# Patient Record
Sex: FEMALE | Race: WHITE
[De-identification: names, ages, dates, MRNs, and addresses within clinical notes are randomized per-mention and may not be internally consistent; named-entity substitution may affect disease eponyms.]

---

## 2017-01-13 ENCOUNTER — PRIOR ORIGINAL RECORDS (OUTPATIENT)
Dept: OTHER | Age: 67
End: 2017-01-13

## 2017-08-30 ENCOUNTER — PRIOR ORIGINAL RECORDS (OUTPATIENT)
Dept: OTHER | Age: 67
End: 2017-08-30

## 2017-09-01 ENCOUNTER — PRIOR ORIGINAL RECORDS (OUTPATIENT)
Dept: OTHER | Age: 67
End: 2017-09-01

## 2017-09-05 ENCOUNTER — MYAURORA ACCOUNT LINK (OUTPATIENT)
Dept: OTHER | Age: 67
End: 2017-09-05

## 2017-11-06 ENCOUNTER — PRIOR ORIGINAL RECORDS (OUTPATIENT)
Dept: OTHER | Age: 67
End: 2017-11-06

## 2017-11-15 ENCOUNTER — PRIOR ORIGINAL RECORDS (OUTPATIENT)
Dept: OTHER | Age: 67
End: 2017-11-15

## 2017-11-27 ENCOUNTER — HOSPITAL ENCOUNTER (OUTPATIENT)
Dept: CARDIOLOGY CLINIC | Facility: HOSPITAL | Age: 67
Discharge: HOME | End: 2017-11-27
Attending: INTERNAL MEDICINE

## 2017-11-27 DIAGNOSIS — I65.23 BILATERAL CAROTID ARTERY STENOSIS: ICD-10-CM

## 2017-12-04 ENCOUNTER — PRIOR ORIGINAL RECORDS (OUTPATIENT)
Dept: OTHER | Age: 67
End: 2017-12-04

## 2017-12-11 ENCOUNTER — PRIOR ORIGINAL RECORDS (OUTPATIENT)
Dept: OTHER | Age: 67
End: 2017-12-11

## 2017-12-13 ENCOUNTER — HOSPITAL ENCOUNTER (OUTPATIENT)
Dept: ULTRASOUND IMAGING | Facility: HOSPITAL | Age: 67
Discharge: HOME | End: 2017-12-13
Attending: INTERNAL MEDICINE
Payer: COMMERCIAL

## 2017-12-13 DIAGNOSIS — E04.1 THYROID NODULE: ICD-10-CM

## 2017-12-13 DIAGNOSIS — E04.1 THYROID CYST: ICD-10-CM

## 2017-12-13 PROCEDURE — 76536 US EXAM OF HEAD AND NECK: CPT | Performed by: INTERNAL MEDICINE

## 2018-06-14 ENCOUNTER — HOSPITAL ENCOUNTER (OUTPATIENT)
Dept: ULTRASOUND IMAGING | Facility: HOSPITAL | Age: 68
Discharge: HOME | End: 2018-06-14
Attending: INTERNAL MEDICINE
Payer: COMMERCIAL

## 2018-06-14 DIAGNOSIS — E04.1 THYROID CYST: ICD-10-CM

## 2018-06-14 DIAGNOSIS — E04.1 THYROID NODULE: ICD-10-CM

## 2018-06-14 PROCEDURE — 76536 US EXAM OF HEAD AND NECK: CPT | Performed by: INTERNAL MEDICINE

## 2018-07-13 ENCOUNTER — PRIOR ORIGINAL RECORDS (OUTPATIENT)
Dept: OTHER | Age: 68
End: 2018-07-13

## 2018-07-31 ENCOUNTER — PRIOR ORIGINAL RECORDS (OUTPATIENT)
Dept: OTHER | Age: 68
End: 2018-07-31

## 2018-09-07 ENCOUNTER — PRIOR ORIGINAL RECORDS (OUTPATIENT)
Dept: OTHER | Age: 68
End: 2018-09-07

## 2018-10-17 ENCOUNTER — HOSPITAL ENCOUNTER (OUTPATIENT)
Dept: CV DIAGNOSTICS | Facility: HOSPITAL | Age: 68
Discharge: HOME | End: 2018-10-17
Attending: INTERNAL MEDICINE
Payer: COMMERCIAL

## 2018-10-17 ENCOUNTER — PRIOR ORIGINAL RECORDS (OUTPATIENT)
Dept: OTHER | Age: 68
End: 2018-10-17

## 2018-10-17 DIAGNOSIS — I10 HTN (HYPERTENSION): ICD-10-CM

## 2018-10-17 DIAGNOSIS — I65.29 CAROTID ARTERY STENOSIS: ICD-10-CM

## 2018-10-17 DIAGNOSIS — R94.31 ABNORMAL EKG: ICD-10-CM

## 2018-10-17 DIAGNOSIS — E78.00 HYPERCHOLESTEREMIA: ICD-10-CM

## 2018-10-17 PROCEDURE — 93350 STRESS TTE ONLY: CPT | Performed by: INTERNAL MEDICINE

## 2018-10-17 PROCEDURE — 93017 CV STRESS TEST TRACING ONLY: CPT | Performed by: INTERNAL MEDICINE

## 2018-10-17 PROCEDURE — 93018 CV STRESS TEST I&R ONLY: CPT | Performed by: INTERNAL MEDICINE

## 2018-10-19 ENCOUNTER — PRIOR ORIGINAL RECORDS (OUTPATIENT)
Dept: OTHER | Age: 68
End: 2018-10-19

## 2018-11-05 ENCOUNTER — MYAURORA ACCOUNT LINK (OUTPATIENT)
Dept: OTHER | Age: 68
End: 2018-11-05

## 2018-11-05 ENCOUNTER — PRIOR ORIGINAL RECORDS (OUTPATIENT)
Dept: OTHER | Age: 68
End: 2018-11-05

## 2018-11-13 LAB
ALBUMIN: 4.6 G/DL
ALKALINE PHOSPHATATE(ALK PHOS): 54 IU/L
BILIRUBIN TOTAL: 0.6 MG/DL
BUN: 14 MG/DL
CALCIUM: 9.8 MG/DL
CHLORIDE: 102 MEQ/L
CHOLESTEROL, TOTAL: 167 MG/DL
GLOBULIN: 2.5 G/DL
GLUCOSE: 105 MG/DL
HDL CHOLESTEROL: 42 MG/DL
HEMATOCRIT: 43.1 %
HEMOGLOBIN A1C: 6.9 %
HEMOGLOBIN: 15.1 G/DL
LDL CHOLESTEROL: 103 MG/DL
PLATELETS: 310 K/UL
POTASSIUM, SERUM: 4.5 MEQ/L
PROTEIN, TOTAL: 7.1 G/DL
RED BLOOD COUNT: 4.65 X 10-6/U
SGOT (AST): 32 IU/L
SGPT (ALT): 27 IU/L
SODIUM: 143 MEQ/L
TRIGLYCERIDES: 112 MG/DL
VITAMIN D 25-OH: 27.6 NG/ML
WHITE BLOOD COUNT: 5.3 X 10-3/U

## 2019-02-28 VITALS — WEIGHT: 230 LBS | DIASTOLIC BLOOD PRESSURE: 80 MMHG | SYSTOLIC BLOOD PRESSURE: 130 MMHG | HEART RATE: 68 BPM

## 2019-02-28 VITALS — HEART RATE: 70 BPM | DIASTOLIC BLOOD PRESSURE: 70 MMHG | SYSTOLIC BLOOD PRESSURE: 106 MMHG | HEIGHT: 67 IN

## 2019-04-17 RX ORDER — NEBIVOLOL HYDROCHLORIDE 5 MG/1
TABLET ORAL
COMMUNITY
End: 2019-11-04 | Stop reason: SDUPTHER

## 2019-06-06 ENCOUNTER — LAB ENCOUNTER (OUTPATIENT)
Dept: LAB | Facility: HOSPITAL | Age: 69
End: 2019-06-06
Attending: INTERNAL MEDICINE
Payer: COMMERCIAL

## 2019-06-06 DIAGNOSIS — R19.7 DIARRHEA: Primary | ICD-10-CM

## 2019-06-06 PROCEDURE — 87493 C DIFF AMPLIFIED PROBE: CPT

## 2019-06-06 PROCEDURE — 87046 STOOL CULTR AEROBIC BACT EA: CPT

## 2019-06-06 PROCEDURE — 87272 CRYPTOSPORIDIUM AG IF: CPT

## 2019-06-06 PROCEDURE — 87045 FECES CULTURE AEROBIC BACT: CPT

## 2019-06-06 PROCEDURE — 87209 SMEAR COMPLEX STAIN: CPT

## 2019-06-06 PROCEDURE — 87077 CULTURE AEROBIC IDENTIFY: CPT

## 2019-06-06 PROCEDURE — 87329 GIARDIA AG IA: CPT

## 2019-06-06 PROCEDURE — 87177 OVA AND PARASITES SMEARS: CPT

## 2019-06-13 ENCOUNTER — LAB ENCOUNTER (OUTPATIENT)
Dept: LAB | Facility: HOSPITAL | Age: 69
End: 2019-06-13
Attending: INTERNAL MEDICINE
Payer: COMMERCIAL

## 2019-06-13 DIAGNOSIS — Z01.818 PREOP TESTING: Primary | ICD-10-CM

## 2019-06-13 PROCEDURE — 36415 COLL VENOUS BLD VENIPUNCTURE: CPT

## 2019-06-13 PROCEDURE — 82565 ASSAY OF CREATININE: CPT

## 2019-06-19 ENCOUNTER — HOSPITAL ENCOUNTER (OUTPATIENT)
Dept: CT IMAGING | Facility: HOSPITAL | Age: 69
Discharge: HOME | End: 2019-06-19
Attending: INTERNAL MEDICINE
Payer: COMMERCIAL

## 2019-06-19 ENCOUNTER — ANESTHESIA EVENT (OUTPATIENT)
Dept: SURGERY | Facility: HOSPITAL | Age: 69
End: 2019-06-19

## 2019-06-19 ENCOUNTER — HOSPITAL ENCOUNTER (INPATIENT)
Facility: HOSPITAL | Age: 69
LOS: 10 days | Discharge: HOME HEALTH | DRG: 329 | End: 2019-06-29
Attending: EMERGENCY MEDICINE | Admitting: INTERNAL MEDICINE
Payer: COMMERCIAL

## 2019-06-19 ENCOUNTER — ANESTHESIA (OUTPATIENT)
Dept: SURGERY | Facility: HOSPITAL | Age: 69
End: 2019-06-19

## 2019-06-19 DIAGNOSIS — K57.40 DIVERTICULITIS OF BOTH SMALL AND LARGE INTESTINE WITH PERFORATION AND ABSCESS: Primary | ICD-10-CM

## 2019-06-19 DIAGNOSIS — K66.8 FREE INTRAPERITONEAL AIR: ICD-10-CM

## 2019-06-19 DIAGNOSIS — K57.92 DIVERTICULITIS: ICD-10-CM

## 2019-06-19 DIAGNOSIS — R10.9 ABDOMINAL PAIN: ICD-10-CM

## 2019-06-19 DIAGNOSIS — R19.5 ABNORMAL STOOLS: ICD-10-CM

## 2019-06-19 LAB
ALBUMIN SERPL-MCNC: 3.1 G/DL (ref 3.4–5)
ALBUMIN/GLOB SERPL: 0.7 {RATIO} (ref 1–2)
ALP LIVER SERPL-CCNC: 79 U/L (ref 55–142)
ALT SERPL-CCNC: 22 U/L (ref 13–56)
ANION GAP SERPL CALC-SCNC: 9 MMOL/L (ref 0–18)
AST SERPL-CCNC: 23 U/L (ref 15–37)
BASOPHILS # BLD AUTO: 0.05 X10(3) UL (ref 0–0.2)
BASOPHILS NFR BLD AUTO: 0.4 %
BILIRUB SERPL-MCNC: 0.7 MG/DL (ref 0.1–2)
BUN BLD-MCNC: 8 MG/DL (ref 7–18)
BUN/CREAT SERPL: 11.1 (ref 10–20)
CALCIUM BLD-MCNC: 8.9 MG/DL (ref 8.5–10.1)
CHLORIDE SERPL-SCNC: 102 MMOL/L (ref 98–112)
CO2 SERPL-SCNC: 27 MMOL/L (ref 21–32)
CREAT BLD-MCNC: 0.72 MG/DL (ref 0.55–1.02)
DEPRECATED RDW RBC AUTO: 46.5 FL (ref 35.1–46.3)
EOSINOPHIL # BLD AUTO: 0.04 X10(3) UL (ref 0–0.7)
EOSINOPHIL NFR BLD AUTO: 0.3 %
ERYTHROCYTE [DISTWIDTH] IN BLOOD BY AUTOMATED COUNT: 13.2 % (ref 11–15)
GLOBULIN PLAS-MCNC: 4.2 G/DL (ref 2.8–4.4)
GLUCOSE BLD-MCNC: 99 MG/DL (ref 70–99)
HCT VFR BLD AUTO: 37.6 % (ref 35–48)
HGB BLD-MCNC: 12.2 G/DL (ref 12–16)
IMM GRANULOCYTES # BLD AUTO: 0.03 X10(3) UL (ref 0–1)
IMM GRANULOCYTES NFR BLD: 0.3 %
LACTATE SERPL-SCNC: 1 MMOL/L (ref 0.4–2)
LYMPHOCYTES # BLD AUTO: 1.95 X10(3) UL (ref 1–4)
LYMPHOCYTES NFR BLD AUTO: 16.4 %
M PROTEIN MFR SERPL ELPH: 7.3 G/DL (ref 6.4–8.2)
MCH RBC QN AUTO: 31 PG (ref 26–34)
MCHC RBC AUTO-ENTMCNC: 32.4 G/DL (ref 31–37)
MCV RBC AUTO: 95.7 FL (ref 80–100)
MONOCYTES # BLD AUTO: 0.79 X10(3) UL (ref 0.1–1)
MONOCYTES NFR BLD AUTO: 6.6 %
NEUTROPHILS # BLD AUTO: 9.06 X10 (3) UL (ref 1.5–7.7)
NEUTROPHILS # BLD AUTO: 9.06 X10(3) UL (ref 1.5–7.7)
NEUTROPHILS NFR BLD AUTO: 76 %
OSMOLALITY SERPL CALC.SUM OF ELEC: 284 MOSM/KG (ref 275–295)
PLATELET # BLD AUTO: 386 10(3)UL (ref 150–450)
POTASSIUM SERPL-SCNC: 3.6 MMOL/L (ref 3.5–5.1)
RBC # BLD AUTO: 3.93 X10(6)UL (ref 3.8–5.3)
SODIUM SERPL-SCNC: 138 MMOL/L (ref 136–145)
WBC # BLD AUTO: 11.9 X10(3) UL (ref 4–11)

## 2019-06-19 PROCEDURE — 99223 1ST HOSP IP/OBS HIGH 75: CPT | Performed by: COLON & RECTAL SURGERY

## 2019-06-19 PROCEDURE — 0DTN0ZZ RESECTION OF SIGMOID COLON, OPEN APPROACH: ICD-10-PCS | Performed by: COLON & RECTAL SURGERY

## 2019-06-19 PROCEDURE — 74177 CT ABD & PELVIS W/CONTRAST: CPT | Performed by: INTERNAL MEDICINE

## 2019-06-19 PROCEDURE — 0UT50ZZ RESECTION OF RIGHT FALLOPIAN TUBE, OPEN APPROACH: ICD-10-PCS | Performed by: COLON & RECTAL SURGERY

## 2019-06-19 PROCEDURE — 0W9F0ZZ DRAINAGE OF ABDOMINAL WALL, OPEN APPROACH: ICD-10-PCS | Performed by: COLON & RECTAL SURGERY

## 2019-06-19 PROCEDURE — 0DBU0ZX EXCISION OF OMENTUM, OPEN APPROACH, DIAGNOSTIC: ICD-10-PCS | Performed by: COLON & RECTAL SURGERY

## 2019-06-19 PROCEDURE — 3E0M05Z INTRODUCTION OF ADHESION BARRIER INTO PERITONEAL CAVITY, OPEN APPROACH: ICD-10-PCS | Performed by: COLON & RECTAL SURGERY

## 2019-06-19 PROCEDURE — 0UT00ZZ RESECTION OF RIGHT OVARY, OPEN APPROACH: ICD-10-PCS | Performed by: COLON & RECTAL SURGERY

## 2019-06-19 PROCEDURE — 44143 PARTIAL REMOVAL OF COLON: CPT | Performed by: COLON & RECTAL SURGERY

## 2019-06-19 PROCEDURE — 07BB0ZX EXCISION OF MESENTERIC LYMPHATIC, OPEN APPROACH, DIAGNOSTIC: ICD-10-PCS | Performed by: COLON & RECTAL SURGERY

## 2019-06-19 PROCEDURE — 58940 REMOVAL OF OVARY(S): CPT | Performed by: COLON & RECTAL SURGERY

## 2019-06-19 PROCEDURE — 0D1N0Z4 BYPASS SIGMOID COLON TO CUTANEOUS, OPEN APPROACH: ICD-10-PCS | Performed by: COLON & RECTAL SURGERY

## 2019-06-19 PROCEDURE — 0DBP0ZZ EXCISION OF RECTUM, OPEN APPROACH: ICD-10-PCS | Performed by: COLON & RECTAL SURGERY

## 2019-06-19 DEVICE — SEPRAFILM ADHESION BARRIER (MEMBRANE) IS A STERILE, BIORESORBABLE, TRANSLUCENT ADHESION BARRIER COMPOSED OF TWO ANIONIC POLYSACCHARIDES, SODIUM HYALURONATE (HA) AND CARBOXYMETHYLCELLULOSE (CMC).
Type: IMPLANTABLE DEVICE | Site: ABDOMEN | Status: FUNCTIONAL
Brand: SEPRAFILM

## 2019-06-19 RX ORDER — DIPHENHYDRAMINE HYDROCHLORIDE 50 MG/ML
12.5 INJECTION INTRAMUSCULAR; INTRAVENOUS EVERY 4 HOURS PRN
Status: DISCONTINUED | OUTPATIENT
Start: 2019-06-19 | End: 2019-06-24

## 2019-06-19 RX ORDER — MEPERIDINE HYDROCHLORIDE 25 MG/ML
12.5 INJECTION INTRAMUSCULAR; INTRAVENOUS; SUBCUTANEOUS AS NEEDED
Status: DISCONTINUED | OUTPATIENT
Start: 2019-06-19 | End: 2019-06-19 | Stop reason: HOSPADM

## 2019-06-19 RX ORDER — MAGNESIUM HYDROXIDE 1200 MG/15ML
LIQUID ORAL CONTINUOUS PRN
Status: COMPLETED | OUTPATIENT
Start: 2019-06-19 | End: 2019-06-19

## 2019-06-19 RX ORDER — DIPHENHYDRAMINE HYDROCHLORIDE 50 MG/ML
12.5 INJECTION INTRAMUSCULAR; INTRAVENOUS AS NEEDED
Status: DISCONTINUED | OUTPATIENT
Start: 2019-06-19 | End: 2019-06-19 | Stop reason: HOSPADM

## 2019-06-19 RX ORDER — HEPARIN SODIUM 5000 [USP'U]/ML
5000 INJECTION, SOLUTION INTRAVENOUS; SUBCUTANEOUS EVERY 8 HOURS SCHEDULED
Status: DISCONTINUED | OUTPATIENT
Start: 2019-06-20 | End: 2019-06-29

## 2019-06-19 RX ORDER — SODIUM CHLORIDE, SODIUM LACTATE, POTASSIUM CHLORIDE, CALCIUM CHLORIDE 600; 310; 30; 20 MG/100ML; MG/100ML; MG/100ML; MG/100ML
INJECTION, SOLUTION INTRAVENOUS CONTINUOUS
Status: DISCONTINUED | OUTPATIENT
Start: 2019-06-19 | End: 2019-06-19 | Stop reason: HOSPADM

## 2019-06-19 RX ORDER — FAMOTIDINE 10 MG/ML
20 INJECTION, SOLUTION INTRAVENOUS 2 TIMES DAILY
Status: DISCONTINUED | OUTPATIENT
Start: 2019-06-19 | End: 2019-06-29

## 2019-06-19 RX ORDER — HYDROMORPHONE HYDROCHLORIDE 1 MG/ML
0.4 INJECTION, SOLUTION INTRAMUSCULAR; INTRAVENOUS; SUBCUTANEOUS EVERY 2 HOUR PRN
Status: CANCELLED | OUTPATIENT
Start: 2019-06-19

## 2019-06-19 RX ORDER — HYDROMORPHONE HYDROCHLORIDE 1 MG/ML
INJECTION, SOLUTION INTRAMUSCULAR; INTRAVENOUS; SUBCUTANEOUS
Status: COMPLETED
Start: 2019-06-19 | End: 2019-06-19

## 2019-06-19 RX ORDER — METOCLOPRAMIDE HYDROCHLORIDE 5 MG/ML
10 INJECTION INTRAMUSCULAR; INTRAVENOUS AS NEEDED
Status: DISCONTINUED | OUTPATIENT
Start: 2019-06-19 | End: 2019-06-19 | Stop reason: HOSPADM

## 2019-06-19 RX ORDER — HYDROCODONE BITARTRATE AND ACETAMINOPHEN 5; 325 MG/1; MG/1
1-2 TABLET ORAL EVERY 4 HOURS PRN
Status: DISCONTINUED | OUTPATIENT
Start: 2019-06-19 | End: 2019-06-21

## 2019-06-19 RX ORDER — HYDROMORPHONE HYDROCHLORIDE 1 MG/ML
0.8 INJECTION, SOLUTION INTRAMUSCULAR; INTRAVENOUS; SUBCUTANEOUS EVERY 2 HOUR PRN
Status: CANCELLED | OUTPATIENT
Start: 2019-06-19

## 2019-06-19 RX ORDER — SODIUM CHLORIDE 9 MG/ML
INJECTION, SOLUTION INTRAVENOUS CONTINUOUS
Status: ACTIVE | OUTPATIENT
Start: 2019-06-19 | End: 2019-06-20

## 2019-06-19 RX ORDER — MIDAZOLAM HYDROCHLORIDE 1 MG/ML
1 INJECTION INTRAMUSCULAR; INTRAVENOUS EVERY 5 MIN PRN
Status: DISCONTINUED | OUTPATIENT
Start: 2019-06-19 | End: 2019-06-19 | Stop reason: HOSPADM

## 2019-06-19 RX ORDER — ACETAMINOPHEN 500 MG
1000 TABLET ORAL EVERY 8 HOURS
Status: DISCONTINUED | OUTPATIENT
Start: 2019-06-19 | End: 2019-06-21

## 2019-06-19 RX ORDER — ONDANSETRON 2 MG/ML
4 INJECTION INTRAMUSCULAR; INTRAVENOUS EVERY 6 HOURS PRN
Status: DISCONTINUED | OUTPATIENT
Start: 2019-06-19 | End: 2019-06-24

## 2019-06-19 RX ORDER — NALOXONE HYDROCHLORIDE 0.4 MG/ML
80 INJECTION, SOLUTION INTRAMUSCULAR; INTRAVENOUS; SUBCUTANEOUS AS NEEDED
Status: DISCONTINUED | OUTPATIENT
Start: 2019-06-19 | End: 2019-06-19 | Stop reason: HOSPADM

## 2019-06-19 RX ORDER — HYDROCODONE BITARTRATE AND ACETAMINOPHEN 5; 325 MG/1; MG/1
2 TABLET ORAL AS NEEDED
Status: DISCONTINUED | OUTPATIENT
Start: 2019-06-19 | End: 2019-06-19 | Stop reason: HOSPADM

## 2019-06-19 RX ORDER — ONDANSETRON 2 MG/ML
4 INJECTION INTRAMUSCULAR; INTRAVENOUS EVERY 6 HOURS PRN
Status: DISCONTINUED | OUTPATIENT
Start: 2019-06-19 | End: 2019-06-29

## 2019-06-19 RX ORDER — HYDROMORPHONE HYDROCHLORIDE 1 MG/ML
0.2 INJECTION, SOLUTION INTRAMUSCULAR; INTRAVENOUS; SUBCUTANEOUS EVERY 2 HOUR PRN
Status: CANCELLED | OUTPATIENT
Start: 2019-06-19

## 2019-06-19 RX ORDER — ACETAMINOPHEN 10 MG/ML
INJECTION, SOLUTION INTRAVENOUS
Status: COMPLETED
Start: 2019-06-19 | End: 2019-06-19

## 2019-06-19 RX ORDER — ACETAMINOPHEN 10 MG/ML
1000 INJECTION, SOLUTION INTRAVENOUS ONCE
Status: COMPLETED | OUTPATIENT
Start: 2019-06-19 | End: 2019-06-19

## 2019-06-19 RX ORDER — DEXTROSE, SODIUM CHLORIDE, AND POTASSIUM CHLORIDE 5; .45; .15 G/100ML; G/100ML; G/100ML
INJECTION INTRAVENOUS CONTINUOUS
Status: DISCONTINUED | OUTPATIENT
Start: 2019-06-19 | End: 2019-06-26

## 2019-06-19 RX ORDER — NALBUPHINE HCL 10 MG/ML
2.5 AMPUL (ML) INJECTION EVERY 4 HOURS PRN
Status: DISCONTINUED | OUTPATIENT
Start: 2019-06-19 | End: 2019-06-24

## 2019-06-19 RX ORDER — HYDROCODONE BITARTRATE AND ACETAMINOPHEN 5; 325 MG/1; MG/1
1 TABLET ORAL AS NEEDED
Status: DISCONTINUED | OUTPATIENT
Start: 2019-06-19 | End: 2019-06-19 | Stop reason: HOSPADM

## 2019-06-19 RX ORDER — KETOROLAC TROMETHAMINE 15 MG/ML
15 INJECTION, SOLUTION INTRAMUSCULAR; INTRAVENOUS EVERY 6 HOURS
Status: COMPLETED | OUTPATIENT
Start: 2019-06-19 | End: 2019-06-20

## 2019-06-19 RX ORDER — ONDANSETRON 2 MG/ML
4 INJECTION INTRAMUSCULAR; INTRAVENOUS AS NEEDED
Status: DISCONTINUED | OUTPATIENT
Start: 2019-06-19 | End: 2019-06-19 | Stop reason: HOSPADM

## 2019-06-19 RX ORDER — NALOXONE HYDROCHLORIDE 0.4 MG/ML
0.08 INJECTION, SOLUTION INTRAMUSCULAR; INTRAVENOUS; SUBCUTANEOUS
Status: DISCONTINUED | OUTPATIENT
Start: 2019-06-19 | End: 2019-06-24

## 2019-06-19 RX ORDER — HYDROMORPHONE HYDROCHLORIDE 1 MG/ML
0.4 INJECTION, SOLUTION INTRAMUSCULAR; INTRAVENOUS; SUBCUTANEOUS EVERY 5 MIN PRN
Status: DISCONTINUED | OUTPATIENT
Start: 2019-06-19 | End: 2019-06-19 | Stop reason: HOSPADM

## 2019-06-19 RX ORDER — NEBIVOLOL 10 MG/1
10 TABLET ORAL
Status: DISCONTINUED | OUTPATIENT
Start: 2019-06-20 | End: 2019-06-29

## 2019-06-19 NOTE — ED PROVIDER NOTES
Patient Seen in: [facility] Emergency Department    History   Patient presents with:  Abnormal Result (metabolic, cardiac)    Stated Complaint: Returned for abnml CT    HPI    CHIEF COMPLAINT: Abdominal pain, diarrhea    HISTORY OF PRESENT ILLNESS: reduction   • MATRL FOR VOCAL CORD     • OTHER  Dec 2013    Lt wrist    • REMOVAL OF TONSILS,12+ Y/O     • TOTAL ABDOM HYSTERECTOMY             Social History    Tobacco Use      Smoking status: Never Smoker    Alcohol use: Not on file    Drug use: Not components:    WBC 11.9 (*)     RDW-SD 46.5 (*)     Neutrophil Absolute Prelim 9.06 (*)     Neutrophil Absolute 9.06 (*)     All other components within normal limits   LACTIC ACID, PLASMA - Normal   CBC WITH DIFFERENTIAL WITH PLATELET    Narrative:      Jose Aceves consult. Patient's CBC revealed a white count 11.9, normal hemoglobin hematocrit. Lactate 1,       I discussed the radiology and laboratory results with the patient. I discussed the diagnosis and admission for further evaluation and care.  Patient state

## 2019-06-19 NOTE — H&P
[facility]  History & Physical    Shahid Lowry Patient Status:  Inpatient    1950 MRN [id number]   [facility] PRE OP HOLDING Attending Re Costa MD   Hosp Day # 0 PCP Syl Wilkes MD     History of Present Illness:  Be before breakfast. Disp:  Rfl:  Not Taking       Review of Systems: No fever chills; some mucoid stools but no gross blood. Physical Exam:    Alert white female, uncomfortable but not in extremis.     /78   Pulse 95   Temp 99.9 °F (37.7 °C) (Tempora reduction techniques were used. Dose information is   transmitted to the [facility] [employer]) NRDR ([address]) which includes the Dose Index Registry.      PATIENT STATED HISTORY:(As transcribed by Technologist)  The pa These are somewhat uncertain   in etiology. Differential considerations are broad and would include abscesses, adnexal cysts, lymphoceles or seromas with or without super infection. Mild to moderate left hydroureter is present.   This may be due to mas

## 2019-06-19 NOTE — ANESTHESIA PREPROCEDURE EVALUATION
PRE-OP EVALUATION    Patient Name: Desiree York    Pre-op Diagnosis: Free intraperitoneal air [K66.8]    Procedure(s):  EXPLORATORY LAPAROTOMY COLON RESECTION  , COLOSTOMY    Surgeon(s) and Role:     Mark Sommer MD - Primary    Pre-op vitals reviewed BUN 8 06/19/2019    CREATSERUM 0.72 06/19/2019    GLU 99 06/19/2019    CA 8.9 06/19/2019            Airway      Mallampati: II  Mouth opening: 3 FB  TM distance: 4 - 6 cm  Neck ROM: full Cardiovascular      Rhythm: regular  Rate: normal     Dental  Comment

## 2019-06-19 NOTE — ED INITIAL ASSESSMENT (HPI)
Patient reports she had a CT of her belly today, was told it was a perforated diverticula with sacs of infection    + abdominal pain, denies fevers or dizziness

## 2019-06-20 NOTE — PROGRESS NOTES
[facility]    Progress Note    Dipesh Lowry Patient Status:  Inpatient    1950 MRN [id number]   [facility] 3NW-A Attending Rafita Cheney MD   Hosp Day # 1 PCP Markell Platt MD       SUBJECTIVE:  No CP, SOB, or N/V. Pt is s Nebivolol HCl (BYSTOLIC) tab 10 mg 10 mg Oral Daily Beta Blocker   Heparin Sodium (Porcine) 5000 UNIT/ML injection 5,000 Units 5,000 Units Subcutaneous Q8H [facility] HOME   Ketorolac Tromethamine (TORADOL) injection 15 mg 15 mg Intravenous Q6H   acetaminophen (TYLE

## 2019-06-20 NOTE — PHYSICAL THERAPY NOTE
PHYSICAL THERAPY EVALUATION - INPATIENT     Room Number: 315/315-A  Evaluation Date: 6/20/2019  Type of Evaluation: Initial  Physician Order: PT Eval and Treat    Presenting Problem: s/p LAR, drainage of pelvic abscess, oophrectomy and new ostomy cre commands with increased time    RANGE OF MOTION AND STRENGTH ASSESSMENT  Upper extremity ROM and strength are within functional limits     Lower extremity ROM is within functional limits     Lower extremity strength is within functional limits     BALANCE mechanics  Functional activity tolerated  Gait training  Transfer training    Patient End of Session: Up in chair;Needs met;Call light within reach;RN aware of session/findings; All patient questions and concerns addressed; Discussed recommendations with keke

## 2019-06-20 NOTE — CONSULTS
Called to evaluate patient for possible post-operative corneal abrasion. Pt evaluated in AM and admits R sided eye burning, but does not admit feeling of foreign body.     Plan on trial of tobramycin ointment in R eye and if not working recommend optho con

## 2019-06-20 NOTE — PROGRESS NOTES
Vss. Pt a&ox3. Pt on ra with 02 sats wnl. Lungs cta. Pt denies difficulty breathing or sob. Pt denies n/v. Tolerating ice chips well. Denies passing flatus. Colostomy appliance intact and small amount of serosanguinous drainage present in appliance bag.  St

## 2019-06-20 NOTE — PROGRESS NOTES
[facility] Pharmacy Note:  Pain Consult    Betsy Guzman is a 76year old female started on Dilaudid PCA by DAVID Valencia. Pharmacy was consulted to review medication profile and to discontinue previously ordered narcotics and sedatives.     Medication pr

## 2019-06-20 NOTE — HOME CARE LIAISON
MET WITH PTNT AND OFFERED CHOICE  OF AGENCIES. PTNT AGREEABLE TO [facility]. MET WITH PTNT TO DISCUSS HOME HEALTH SERVICES AND COVERAGE CRITERIA. PTNT AGREEABLE TO S[facility]. PTNT GIVEN RESIDENTIAL BROCHURE.  RESIDENTIAL WITH PROVIDE SN ON DISCHAR

## 2019-06-20 NOTE — OCCUPATIONAL THERAPY NOTE
OCCUPATIONAL THERAPY QUICK EVALUATION - INPATIENT    Room Number: 315/315-A  Evaluation Date: 6/20/2019     Type of Evaluation: Initial  Presenting Problem: colon resection    Physician Order: IP Consult to Occupational Therapy  Reason for Therapy:  ADL/IA Activity promotion; Body mechanics;Breathing techniques;Relaxation;Repositioning    COGNITION  WNL    RANGE OF MOTION AND STRENGTH ASSESSMENT  Upper extremity ROM is within functional limits     Upper extremity strength is within functional limits     NEURO all ADL tasks, functional transfers, dynamic reaching and functional mobility safely, without loss of balance, and at supervision level or above. Patient reports no further questions or concerns about safe return to I/ADL tasks.  No further OT services need

## 2019-06-20 NOTE — PROGRESS NOTES
Pt. C/o bilateral hand swelling and bilateral feet swelling. Pt. Has been laying and resting in bed in dependent position. Instructed pt. To do range of motion, move extremities. Dr. Lj Smith paged to ask about IVF orders.

## 2019-06-20 NOTE — PROGRESS NOTES
Patient states pain to right eye is much better since receiving dose of tobramycin dose this am. Pt states it did leave a bit of film in eye and made it harder to see for a short time after application.  Pt refusing dose at this time due to that but states

## 2019-06-20 NOTE — CM/SW NOTE
75 yo sp LAR, drainage of pelvic abscess, oophrectomy and new ostomy creation. Discussed pt during daily rounds and per RN pt should have f/u teaching for ostomy care. Referred to [facility] with [facility]. She will meet with pt.  /case manage

## 2019-06-20 NOTE — PAYOR COMM NOTE
--------------  ADMISSION REVIEW     Payor: Marco A [facility] #:  [id number]  Authorization Number: N/A    ED Provider Notes      Patient Seen in: [facility] Emergency Department    History   Patient presents with:  Noemy West 120/80   Pulse 105   Resp 18   Temp 99.9 °F (37.7 °C)   Temp src Temporal   SpO2 95 %   O2 Device None (Room air)       Current:/78   Pulse 95   Temp 99.9 °F (37.7 °C) (Temporal)   Resp 16   Ht 167.6 cm (5' 6\")   Wt 81.6 kg   SpO2 96%   BMI 29.05 kg Abnormal            Final result                 Please view results for these tests on the individual orders.    RAINBOW DRAW BLUE   RAINBOW DRAW LAVENDER   RAINBOW DRAW LIGHT GREEN   RAINBOW DRAW GOLD           Ct Abdomen+pelvis(contrast Only)(cpt=74177) The patient was called and referred urgently to the [facility] emergency room. She was     Physical Exam:    Alert white female, uncomfortable but not in extremis.     /78   Pulse 95   Temp 99.9 °F (37.7 °C) (Temporal)   Resp 16   Ht 167.6 cm (5' 6\") is no prior CT for comparison    Hypertension––Blood pressure is mildly elevated no doubt due to pain and acute events    Dyslipidemia–continue medication when patient resumes p.o.     General–additional orders based on patient findings at surgery and subse L/min         06/20/19 0559 98.4 °F (36.9 °C) — — 109/61 — — —       06/19/19 1434 99.9 °F (37.7 °C) 105 18 120/80 95 % 180 lb None (Room air)             Appropriate for inpatient status per guidelines for perforated diverticulitis with free intraperitone

## 2019-06-20 NOTE — PROGRESS NOTES
[facility]  Progress Note    Shola Lowry Patient Status:  Inpatient    1950 MRN [id number]   [facility] 3NW-A Attending Misti Amin MD   Hosp Day # 1 PCP Diana Carr MD     Subjective:  No new complaints, incisional pa Acute diverticulitis    Perforated diverticulitis path pending    Plan:  1. IV's, Ambulate, DVT prophylaxis  2. Clear liquids, advance as tolerated  3. Stoma care consult  4. May shower    My total face time with this patient was 30 minutes.   [facility] curtis

## 2019-06-20 NOTE — ANESTHESIA POSTPROCEDURE EVALUATION
[address] [place] [address] Patient Status:  Inpatient   Age/Gender 76year old female MRN [id number]   [facility] SURGERY Attending Amadou Hoffman MD   Hosp Day # 0 PCP Jenny Schwarz MD       Anesthesia Post-op Note    Procedure(s):

## 2019-06-20 NOTE — PROGRESS NOTES
Pt. C/o right eye \"shooting pain\" right side of eye. Pt. Denies double or blurry vision. No redness or drainage noted. Anesthesia paged [phone]. Anesthesia paged at [phone]. Pt. States eye pain \"getting worse. \"    Called and spoke with Dr. Jesus Prado re: right e

## 2019-06-20 NOTE — OPERATIVE REPORT
[facility]  Op Note    Loco Ruiz Location: OR   [facility] [id number] MRN [id number]   Admission Date 6/19/2019 Operation Date 6/19/2019   Attending Physician Peewee Bucio MD Operating Physician Cris Crow MD     Pre-Operative Diagnosis: Free intraper down a portion of the significant abscess. The patient does not appear to have a left ovary remnant, however the left tube is greatly enlarged and covering the abscess.   The vaginal cuff and posterior wall of the vagina were also making of the abscess wal omentum, above the omentum but below the anterior fascia. Operative Summary:   Following adequate general anesthesia, the patient was placed in the supine position on the operating room table.      The legs were placed in the semi-lithotomy positio malignancy. When I took down the rectal mesentery, a 1 cm lymph node with a white marbled appearance was also identified. This was sent for pathology using frozen section to determine whether malignancy was present.   No malignancy was identified under clamps in a vertical fashion. A  stay suture was placed in the midportion of this vertical alignment. Using a laparoscopic instrument I was able to close the rectal remnant with a staple line.   Insufflation of air into the rectum revealed some small bubb steel staples. Sterile Tegaderm dressings were placed on the incision. The colostomy was matured in a [place] type fashion with interrupted 3-0 Vicryl suture. A stomal appliance was placed.  The patient was delivered to recovery room in stable posto

## 2019-06-20 NOTE — PLAN OF CARE
Problem: PAIN - ADULT  Goal: Verbalizes/displays adequate comfort level or patient's stated pain goal  Description  INTERVENTIONS:  - Encourage pt to monitor pain and request assistance  - Assess pain using appropriate pain scale  - Administer analges covered with tegaderm, scant serosanguinous drainage within. Right TONNY intact, drainage serosanguinous drainage. Reviewed plan of care with pt, will monitor.

## 2019-06-21 ENCOUNTER — APPOINTMENT (OUTPATIENT)
Dept: GENERAL RADIOLOGY | Facility: HOSPITAL | Age: 69
DRG: 329 | End: 2019-06-21
Attending: PHYSICIAN ASSISTANT
Payer: COMMERCIAL

## 2019-06-21 ENCOUNTER — APPOINTMENT (OUTPATIENT)
Dept: GENERAL RADIOLOGY | Facility: HOSPITAL | Age: 69
DRG: 329 | End: 2019-06-21
Attending: COLON & RECTAL SURGERY
Payer: COMMERCIAL

## 2019-06-21 LAB — POTASSIUM SERPL-SCNC: 4.4 MMOL/L (ref 3.5–5.1)

## 2019-06-21 PROCEDURE — 71045 X-RAY EXAM CHEST 1 VIEW: CPT | Performed by: PHYSICIAN ASSISTANT

## 2019-06-21 PROCEDURE — 71045 X-RAY EXAM CHEST 1 VIEW: CPT | Performed by: COLON & RECTAL SURGERY

## 2019-06-21 RX ORDER — LORAZEPAM 2 MG/ML
0.5 INJECTION INTRAMUSCULAR EVERY 6 HOURS PRN
Status: DISCONTINUED | OUTPATIENT
Start: 2019-06-21 | End: 2019-06-29

## 2019-06-21 RX ORDER — HYDROMORPHONE HYDROCHLORIDE 1 MG/ML
1 INJECTION, SOLUTION INTRAMUSCULAR; INTRAVENOUS; SUBCUTANEOUS EVERY 2 HOUR PRN
Status: DISCONTINUED | OUTPATIENT
Start: 2019-06-21 | End: 2019-06-29

## 2019-06-21 RX ORDER — METOCLOPRAMIDE HYDROCHLORIDE 5 MG/ML
INJECTION INTRAMUSCULAR; INTRAVENOUS
Status: DISPENSED
Start: 2019-06-21 | End: 2019-06-21

## 2019-06-21 RX ORDER — METOCLOPRAMIDE HYDROCHLORIDE 5 MG/ML
10 INJECTION INTRAMUSCULAR; INTRAVENOUS EVERY 6 HOURS
Status: DISCONTINUED | OUTPATIENT
Start: 2019-06-21 | End: 2019-06-29

## 2019-06-21 RX ORDER — HYDROMORPHONE HYDROCHLORIDE 1 MG/ML
0.5 INJECTION, SOLUTION INTRAMUSCULAR; INTRAVENOUS; SUBCUTANEOUS EVERY 2 HOUR PRN
Status: DISCONTINUED | OUTPATIENT
Start: 2019-06-21 | End: 2019-06-29

## 2019-06-21 RX ORDER — ACETAMINOPHEN 10 MG/ML
1000 INJECTION, SOLUTION INTRAVENOUS EVERY 6 HOURS PRN
Status: DISCONTINUED | OUTPATIENT
Start: 2019-06-21 | End: 2019-06-22 | Stop reason: SDUPTHER

## 2019-06-21 NOTE — PLAN OF CARE
Deirdre HERNANDEZ notified of pt's continued nausea despite Zofran and Reglan and remaining NPO. Orders received to place NGT to LIS. NGT inserted to right nare without complication and [id number] of clear residual returned. Placement verified with auscultation.  Deandre

## 2019-06-21 NOTE — PLAN OF CARE
Upon assessment VSS, afebrile. Pt states, \"I feel miserable\". Pt rates pain to abdomen 2/10, declining needing pain medication and not using Dilaudid PCA. Orders to d/c PCA. Abdomen is soft, rounded and tender. Bowel sounds hypoactive x4.  Pt c/o continue ADULT  Goal: Minimal or absence of nausea and vomiting  Description  INTERVENTIONS:  - Maintain adequate hydration with IV or PO as ordered and tolerated  - Nasogastric tube to low intermittent suction as ordered  - Evaluate effectiveness of ordered antiem development  - Assess and document skin integrity  - Assess and document dressing/incision, wound bed, drain sites and surrounding tissue  - Implement wound care per orders  - Initiate isolation precautions as appropriate  - Initiate Pressure Ulcer prevent

## 2019-06-21 NOTE — PROGRESS NOTES
[facility]  Progress Note    Holden Lowry Patient Status:  Inpatient    1950 MRN [id number]   [facility] 3NW-A Attending Romelia Belcher MD   Hosp Day # 2 PCP Cinthia Fonseca MD     Subjective:  Not feeling well this morning.  Earnstine Beverage counseling the patient on the above listed diagnoses and treatment options.     Elsie Mas  6/21/2019  9:24 AM

## 2019-06-21 NOTE — PROGRESS NOTES
[facility]    Progress Note    Ashley Lowry Patient Status:  Inpatient    1950 MRN [id number]   [facility] 3NW-A Attending Maurice Velazquez MD   Hosp Day # 2 PCP Andreina Vasquez MD       SUBJECTIVE:  NAUSEA AND ACHINESS TODAY mg 4 mg Intravenous Q6H PRN   famoTIDine (PEPCID) injection 20 mg 20 mg Intravenous BID   cefOXitin Sodium (MEFOXIN) 2 g in sodium chloride 0.9% 100 mL MBP 2 g Intravenous Q8H   ondansetron HCl (ZOFRAN) injection 4 mg 4 mg Intravenous Q6H PRN   Naloxone HC

## 2019-06-21 NOTE — PLAN OF CARE
Repeat CXR results noted and Dr. Froilan Mcdaniel notified of results and pt's continued c/o nausea. Ativan PRN ordered. Tylenol IV ordered. IVF increased. Pt informed POC. Verbalized understanding. Will continue to monitor.

## 2019-06-21 NOTE — CONSULTS
[facility]  Inpatient Ostomy Progress Note    [place] Jered Essence Patient Status:  Inpatient    1950 MRN [id number]   [facility] 3NW-A Attending Lissette Pacheco MD   Days in hospital 2 Primary Lachelle Pugh MD     History of Present Purchasing supplies  yes        Teaching tools used    Video yes    Learning packet   yes    Demonstration  yes    Return Demonstration  no            Outcome of Education:  Needs reinforcement, Observed demonstration and Shows understanding    Pt regist

## 2019-06-21 NOTE — PLAN OF CARE
CXR noted that NG tube is coiled. Pt reports continued nausea despite NGT and requesting NGT to be removed. No additional drainage noted (from the 350ml originally) Pt encouraged to keep NGT in and emotional support provided.  NGT pulled back 5 cm and green

## 2019-06-21 NOTE — PLAN OF CARE
Problem: Patient/Family Goals  Goal: Patient/Family Long Term Goal  Description  Patient's Long Term Goal: Discharge home    Interventions:  - tolerate diet  -return of bowel function  -pain control  - See additional Care Plan goals for specific interven PO as ordered and tolerated  - Evaluate effectiveness of GI medications  - Encourage mobilization and activity  - Obtain nutritional consult as needed  - Establish a toileting routine/schedule  - Consider collaborating with pharmacy to review patient's med

## 2019-06-22 RX ORDER — ACETAMINOPHEN 10 MG/ML
1000 INJECTION, SOLUTION INTRAVENOUS EVERY 6 HOURS PRN
Status: DISCONTINUED | OUTPATIENT
Start: 2019-06-22 | End: 2019-06-29

## 2019-06-22 NOTE — PROGRESS NOTES
[facility]    Progress Note    Hortensia Lowry Patient Status:  Inpatient    1950 MRN [id number]   [facility] 3NW-A Attending William Mcrae MD   Hosp Day # 3 PCP Mardene Pallas, MD       SUBJECTIVE:  Extremely nauseous yesterd Meds:     Current Facility-Administered Medications:  Metoclopramide HCl (REGLAN) injection 10 mg 10 mg Intravenous Q6H   HYDROmorphone HCl (DILAUDID) 1 MG/ML injection 0.5 mg 0.5 mg Intravenous Q2H PRN   Or      HYDROmorphone HCl (DILAUDID) 1 MG/ML

## 2019-06-22 NOTE — PLAN OF CARE
Pt up ambulating in halls & sitting in chair throughout shift. Activity encouraged. Denies c/o pain  Reports ngt provides adequate nausea control. Ngt producing green fluid  Bs very hypo.   No gas or stool noted to colostomy  States she does not feel any

## 2019-06-22 NOTE — PROGRESS NOTES
[facility]  Progress Note    Dipesh Lowry Patient Status:  Inpatient    1950 MRN [id number]   [facility] 3NW-A Attending Rafita Cheney MD   Hosp Day # 3 PCP Markell Platt MD     Subjective:  No new complaints, incisional pa

## 2019-06-22 NOTE — PLAN OF CARE
Problem: PAIN - ADULT  Goal: Verbalizes/displays adequate comfort level or patient's stated pain goal  Description  INTERVENTIONS:  - Encourage pt to monitor pain and request assistance  - Assess pain using appropriate pain scale  - Administer analgesics engage patient/family in tolerated activity level and precautions  - Up to chair x 3 daily   Outcome: Progressing     Problem: Impaired Activities of Daily Living  Goal: Achieve highest/safest level of independence in self care  Description  Interventions:

## 2019-06-23 NOTE — PLAN OF CARE
Problem: Patient/Family Goals  Goal: Patient/Family Long Term Goal  Description  Patient's Long Term Goal:    Interventions:  -  - See additional Care Plan goals for specific interventions  Outcome: Progressing  Goal: Patient/Family Short Term Goal  Desc Establish a toileting routine/schedule  - Consider collaborating with pharmacy to review patient's medication profile  Outcome: Not Progressing     Problem: Impaired Functional Mobility  Goal: Achieve highest/safest level of mobility/gait  Description  Int

## 2019-06-23 NOTE — PROGRESS NOTES
New orders noted from dr Ellen Quesada to consult id. Paging dr Jazzy Schneider @ this time. Perianal abscess

## 2019-06-23 NOTE — PROGRESS NOTES
[facility]  Progress Note    Lawson Lowry Patient Status:  Inpatient    1950 MRN [id number]   [facility] 3NW-A Attending Lissette Pacheco MD   Hosp Day # 4 PCP Lachelle Pugh MD     Subjective:  Feels ok.   No flatus or BM from

## 2019-06-23 NOTE — PROGRESS NOTES
[facility]  Progress Note    Cary Lowry Patient Status:  Inpatient    1950 MRN [id number]   [facility] 3NW-A Attending Yuriy Posada MD   Hosp Day # 4 PCP Sanaz Rivera MD         SUBJECTIVE:  Subjective:  eBtsy Lowry i Intravenous Q6H   • Nebivolol HCl  10 mg Oral Daily Beta Blocker   • Heparin Sodium (Porcine)  5,000 Units Subcutaneous Q8H [address]   • famoTIDine  20 mg Intravenous BID   • cefOXitin  2 g Intravenous Q8H     • KCl in Dextrose-NaCl 100 mL/hr at 06/23/19 1005

## 2019-06-23 NOTE — CONSULTS
INFECTIOUS DISEASE CONSULT NOTE    Eden Lowry Patient Status:  Inpatient    1950 MRN [id number]   [facility] 3NW-A Attending Patrice Michael MD   [facility] Day # 4 PCP Meka Mcgraw Intravenous, Q2H PRN  •  LORazepam (ATIVAN) injection 0.5 mg, 0.5 mg, Intravenous, Q6H PRN  •  Nebivolol HCl (BYSTOLIC) tab 10 mg, 10 mg, Oral, Daily Beta Blocker  •  Heparin Sodium (Porcine) 5000 UNIT/ML injection 5,000 Units, 5,000 Units, Subcutaneous, Q Lab 06/19/19  1546 06/21/19  0512   GLU 99  --    BUN 8  --    CREATSERUM 0.72  --    GFRAA 100  --    GFRNAA 86  --    CA 8.9  --    ALB 3.1*  --      --    K 3.6 4.4     --    CO2 27.0  --    ALKPHO 79  --    AST 23  --    ALT 22  --    AGUSTÍN was performed from the dome of the diaphragm to the pubic symphysis with non-ionic intravenous contrast material. Post contrast coronal MPR imaging was performed. Dose reduction techniques were used.  Dose information is transmitted to the [facility] air.  The findings are suggestive of diverticulitis with perforation. There are pelvic fluid collections present as described above. These are somewhat uncertain in etiology.   Differential considerations are broad and would include abscesses, adnexal cys curling in the gastric fundus is noted. No consolidation, pleural effusion or pneumothorax. CONCLUSION:  Enteric catheter curling in the gastric fundus is noted.     Dictated by: Jag Fonseca MD on 6/21/2019 at 14:54     Approved by: Zhane Huerta

## 2019-06-24 LAB
ALBUMIN SERPL-MCNC: 2.1 G/DL (ref 3.4–5)
ALBUMIN/GLOB SERPL: 0.6 {RATIO} (ref 1–2)
ALP LIVER SERPL-CCNC: 61 U/L (ref 55–142)
ALT SERPL-CCNC: 17 U/L (ref 13–56)
ANION GAP SERPL CALC-SCNC: 5 MMOL/L (ref 0–18)
AST SERPL-CCNC: 21 U/L (ref 15–37)
BASOPHILS # BLD AUTO: 0.05 X10(3) UL (ref 0–0.2)
BASOPHILS NFR BLD AUTO: 0.5 %
BILIRUB SERPL-MCNC: 0.5 MG/DL (ref 0.1–2)
BUN BLD-MCNC: 3 MG/DL (ref 7–18)
BUN/CREAT SERPL: 4.8 (ref 10–20)
CALCIUM BLD-MCNC: 8.5 MG/DL (ref 8.5–10.1)
CHLORIDE SERPL-SCNC: 104 MMOL/L (ref 98–112)
CO2 SERPL-SCNC: 31 MMOL/L (ref 21–32)
CREAT BLD-MCNC: 0.62 MG/DL (ref 0.55–1.02)
DEPRECATED RDW RBC AUTO: 48 FL (ref 35.1–46.3)
EOSINOPHIL # BLD AUTO: 0.29 X10(3) UL (ref 0–0.7)
EOSINOPHIL NFR BLD AUTO: 2.8 %
ERYTHROCYTE [DISTWIDTH] IN BLOOD BY AUTOMATED COUNT: 13.3 % (ref 11–15)
GLOBULIN PLAS-MCNC: 3.4 G/DL (ref 2.8–4.4)
GLUCOSE BLD-MCNC: 115 MG/DL (ref 70–99)
HAV IGM SER QL: 1.9 MG/DL (ref 1.6–2.6)
HCT VFR BLD AUTO: 30.7 % (ref 35–48)
HGB BLD-MCNC: 9.7 G/DL (ref 12–16)
IMM GRANULOCYTES # BLD AUTO: 0.13 X10(3) UL (ref 0–1)
IMM GRANULOCYTES NFR BLD: 1.2 %
LYMPHOCYTES # BLD AUTO: 2.31 X10(3) UL (ref 1–4)
LYMPHOCYTES NFR BLD AUTO: 22.1 %
M PROTEIN MFR SERPL ELPH: 5.5 G/DL (ref 6.4–8.2)
MCH RBC QN AUTO: 30.7 PG (ref 26–34)
MCHC RBC AUTO-ENTMCNC: 31.6 G/DL (ref 31–37)
MCV RBC AUTO: 97.2 FL (ref 80–100)
MONOCYTES # BLD AUTO: 0.87 X10(3) UL (ref 0.1–1)
MONOCYTES NFR BLD AUTO: 8.3 %
NEUTROPHILS # BLD AUTO: 6.8 X10 (3) UL (ref 1.5–7.7)
NEUTROPHILS # BLD AUTO: 6.8 X10(3) UL (ref 1.5–7.7)
NEUTROPHILS NFR BLD AUTO: 65.1 %
OSMOLALITY SERPL CALC.SUM OF ELEC: 287 MOSM/KG (ref 275–295)
PHOSPHATE SERPL-MCNC: 4.1 MG/DL (ref 2.5–4.9)
PLATELET # BLD AUTO: 414 10(3)UL (ref 150–450)
POTASSIUM SERPL-SCNC: 3.9 MMOL/L (ref 3.5–5.1)
RBC # BLD AUTO: 3.16 X10(6)UL (ref 3.8–5.3)
SODIUM SERPL-SCNC: 140 MMOL/L (ref 136–145)
WBC # BLD AUTO: 10.5 X10(3) UL (ref 4–11)

## 2019-06-24 NOTE — PROGRESS NOTES
[facility]    Progress Note    Erendira Lowry Patient Status:  Inpatient    1950 MRN [id number]   [facility] [place] 3NW-A Attending Umer Ryder MD   Hosp Day # 5 PCP Briana Worrell MD       SUBJECTIVE:  No CP, SOB, or N/V.   Still Metoclopramide HCl (REGLAN) injection 10 mg 10 mg Intravenous Q6H   HYDROmorphone HCl (DILAUDID) 1 MG/ML injection 0.5 mg 0.5 mg Intravenous Q2H PRN   Or      HYDROmorphone HCl (DILAUDID) 1 MG/ML injection 1 mg 1 mg Intravenous Q2H PRN   LORazepam (ATIVA

## 2019-06-24 NOTE — PROGRESS NOTES
INFECTIOUS DISEASE PROGRESS NOTE    Sarah Lowry Patient Status:  Inpatient    1950 MRN [id number]   [facility] 3NW-A Attending Clinton Bragg MD   [address] Day # 5 PCP Tete Delong Supple. Respiratory: Clear to auscultation bilaterally. No wheezes. No rhonchi. Cardiovascular: S1, S2.  Regular rate and rhythm. No murmurs. Abdomen: Soft, nontender, nondistended. Incision clean. Decreased bowel sounds.   Musculoskeletal: Full range (no method available)     Ampicillin 8 Sensitive      Cefazolin <=4 Sensitive      Ciprofloxacin <=0.25 Sensitive      Gentamicin <=1 Sensitive      Meropenem <=0.25 Sensitive      Levofloxacin <=0.12 Sensitive      Piperacillin + Tazobactam <=4 Sensitive

## 2019-06-24 NOTE — PHYSICAL THERAPY NOTE
PHYSICAL THERAPY TREATMENT NOTE - INPATIENT    Room Number: 785/560-Y     Session: 1   Number of Visits to Meet Established Goals: 3     History related to current admission: Pt was admitted from home on 6/19/2019 s/p LAR, drainage of pelvic abscess, maribeloph Sitting down on and standing up from a chair with arms (e.g., wheelchair, bedside commode, etc.): None   -   Moving from lying on back to sitting on the side of the bed?: None   How much help from another person does the patient currently need. ..   -   [facility] STAIRS     Goal #2 Patient is able to demonstrate transfers Sit to/from Stand at assistance level: modified independent   GOAL MET   Goal #3 Patient is able to ambulate 300 feet with assist device: none at assistance level: supervision   GOAL MET   Goal #4

## 2019-06-24 NOTE — PROGRESS NOTES
[facility]  Progress Note    Shahid Lowry Patient Status:  Inpatient    1950 MRN [id number]   [facility] 3NW-A Attending Re Costa MD   Hosp Day # 5 PCP Syl Wilkes MD     Subjective:  No new complaints, incisional pa recorded.     Assessment  Patient Active Problem List:     Osteoarthrosis, unspecified whether generalized or localized, lower leg     Diverticulitis of both small and large intestine with perforation and abscess     Acute diverticulitis    POD 5 Muna's

## 2019-06-24 NOTE — PLAN OF CARE
Problem: Patient/Family Goals  Goal: Patient/Family Long Term Goal  Description  Patient's Long Term Goal:     Interventions:  -   - See additional Care Plan goals for specific interventions  Outcome: Progressing  Goal: Patient/Family Short Term Goal  Kapil Gray needed  - Establish a toileting routine/schedule  - Consider collaborating with pharmacy to review patient's medication profile  Outcome: Not Progressing     Problem: Impaired Functional Mobility  Goal: Achieve highest/safest level of mobility/gait  Descri

## 2019-06-24 NOTE — DIETARY NOTE
NUTRITION INITIAL ASSESSMENT    Pt does not meet malnutrition criteria.       NUTRITION DIAGNOSIS/PROBLEM:    Inadequate oral intake related to inability to consume sufficient energy as evidenced by diet intolerance, prolonged NPO     NUTRITION INTERVENTION discretion    MONITOR AND EVALUATE/NUTRITION GOALS:  1. Pt's nutrition needs will be met via the most appropriate route   2. Pt's GI function returns   3.  Maintain healthy weight     MEDICATIONS:  Noted    LABS:  Noted    Pt is at high nutrition risk    FO

## 2019-06-25 LAB
ALBUMIN SERPL-MCNC: 2.4 G/DL (ref 3.4–5)
ALBUMIN/GLOB SERPL: 0.6 {RATIO} (ref 1–2)
ALP LIVER SERPL-CCNC: 90 U/L (ref 55–142)
ALT SERPL-CCNC: 34 U/L (ref 13–56)
ANION GAP SERPL CALC-SCNC: 4 MMOL/L (ref 0–18)
AST SERPL-CCNC: 43 U/L (ref 15–37)
BILIRUB SERPL-MCNC: 0.7 MG/DL (ref 0.1–2)
BUN BLD-MCNC: 4 MG/DL (ref 7–18)
BUN/CREAT SERPL: 7 (ref 10–20)
CALCIUM BLD-MCNC: 8.9 MG/DL (ref 8.5–10.1)
CHLORIDE SERPL-SCNC: 104 MMOL/L (ref 98–112)
CO2 SERPL-SCNC: 29 MMOL/L (ref 21–32)
CREAT BLD-MCNC: 0.57 MG/DL (ref 0.55–1.02)
GLOBULIN PLAS-MCNC: 3.7 G/DL (ref 2.8–4.4)
GLUCOSE BLD-MCNC: 110 MG/DL (ref 70–99)
HAV IGM SER QL: 1.9 MG/DL (ref 1.6–2.6)
M PROTEIN MFR SERPL ELPH: 6.1 G/DL (ref 6.4–8.2)
OSMOLALITY SERPL CALC.SUM OF ELEC: 282 MOSM/KG (ref 275–295)
PHOSPHATE SERPL-MCNC: 4.4 MG/DL (ref 2.5–4.9)
POTASSIUM SERPL-SCNC: 3.9 MMOL/L (ref 3.5–5.1)
SODIUM SERPL-SCNC: 137 MMOL/L (ref 136–145)

## 2019-06-25 NOTE — PROGRESS NOTES
Pt AOx4. NG to LIS draining dark brown. Abdomen soft;nondistended. Flatus through ostomy. Midline incision with staples C/D/I. Ostomy draining small amounts serosanguinous. Raza to right side draining serous. Pain tolerable.  Up with assist. ERAS protocol in

## 2019-06-25 NOTE — PROGRESS NOTES
[facility]  Progress Note    Na Lowry Patient Status:  Inpatient    1950 MRN [id number]   [facility] 3NW-A Attending Alysia Rainey MD   Hosp Day # 6 PCP Freeman Richardson MD     Subjective:   The patient is sitting comfortab procedure for perforated diverticulitis  Expected ileus has not yet resolved    Plan:  1. Will trial NGT clamp today, if patient tolerates will D/C NGT and start CLD  2. Will require TPN if she does not tolerate NGT clamp  3.  Encouraged continued ambulatio

## 2019-06-25 NOTE — PROGRESS NOTES
[facility]    Progress Note    Brandon Lowry Patient Status:  Inpatient    1950 MRN [id number]   [facility] 3NW-A Attending Socrates Smith MD   Hosp Day # 6 PCP Linnette Howard MD       SUBJECTIVE:  NGT CLAMPED FOR P.O.  Hima Piper Q6H PRN   Nebivolol HCl (BYSTOLIC) tab 10 mg 10 mg Oral Daily Beta Blocker   Heparin Sodium (Porcine) 5000 UNIT/ML injection 5,000 Units 5,000 Units Subcutaneous Q8H [facility]   dextrose 5 % and 0.45 % NaCl with KCl 20 mEq infusion  Intravenous Continuous   onda

## 2019-06-25 NOTE — PLAN OF CARE
Problem: PAIN - ADULT  Goal: Verbalizes/displays adequate comfort level or patient's stated pain goal  Description  INTERVENTIONS:  - Encourage pt to monitor pain and request assistance  - Assess pain using appropriate pain scale  - Administer analgesics b patient/family in tolerated activity level and precautions  - Up to chair x 3 daily   Outcome: Progressing     Problem: Impaired Activities of Daily Living  Goal: Achieve highest/safest level of independence in self care  Description  Interventions:  - Ass

## 2019-06-26 NOTE — PLAN OF CARE
Resumed care 2300. Pt resting comfortably in bed. Denies pain. No nausea/ vomiting noted. Afebrile. Will monitor.    Problem: PAIN - ADULT  Goal: Verbalizes/displays adequate comfort level or patient's stated pain goal  Description  INTERVENTIONS:  - Encour

## 2019-06-26 NOTE — PROGRESS NOTES
INFECTIOUS DISEASE PROGRESS NOTE    Bren Lowry Patient Status:  Inpatient    1950 MRN [id number]   [facility] 3NW-A Attending Amadou Hoffman MD   [facility] Day # 7 PCP Vandana Aquino bilaterally. No wheezes. No rhonchi. Cardiovascular: S1, S2.  Regular rate and rhythm. No murmurs. Abdomen: Soft, nontender, nondistended. Incision clean. + bowel sounds. Musculoskeletal: Full range of motion of all extremities. No swelling noted. Resistant     Escherichia coli -  (no method available)     Ampicillin 8 Sensitive      Cefazolin <=4 Sensitive      Ciprofloxacin <=0.25 Sensitive      Gentamicin <=1 Sensitive      Meropenem <=0.25 Sensitive      Levofloxacin <=0.12 Sensitive      Pipera

## 2019-06-26 NOTE — PROGRESS NOTES
[facility]  Progress Note    Julia Lowry Patient Status:  Inpatient    1950 MRN [id number]   [facility] 3NW-A Attending Shady Nguyen MD   Hosp Day # 7 PCP Jordana Zepeda MD     Subjective:  Pt doing ok, tolerating small am diagnoses and treatment options. Les Andres  6/26/2019  9:35 AM    Addendum:  Dr. Mickael Schwab      I have reviewed the above listed note and evaluation by the physician assistant.     I have personally examined the patient and reviewed all relevant labs

## 2019-06-26 NOTE — PROGRESS NOTES
[facility]    Progress Note    Vanessa Corkymaribel Lowry Patient Status:  Inpatient    1950 MRN [id number]   [facility] 3NW-A Attending Suresh Youssef MD   Hosp Day # 7 PCP Delano Rojas MD       SUBJECTIVE:  Tolerating CL diet.   Pain famoTIDine (PEPCID) injection 20 mg 20 mg Intravenous BID   cefOXitin Sodium (MEFOXIN) 2 g in sodium chloride 0.9% 100 mL MBP 2 g Intravenous Q8H         Assessment  Patient Active Problem List:     Osteoarthrosis, unspecified whether generalized or loca

## 2019-06-27 NOTE — CM/SW NOTE
Pt will not need TPN or IV ABX. Pt will go home with [facility] thru [facility] for new ostomy care.

## 2019-06-27 NOTE — PLAN OF CARE
Problem: Patient/Family Goals  Goal: Patient/Family Long Term Goal  Description  Patient's Long Term Goal: Go home    Interventions:  - tolerate diet  -stool production in ostomy   - See additional Care Plan goals for specific interventions  Outcome: Pro tolerated  - Evaluate effectiveness of GI medications  - Encourage mobilization and activity  - Obtain nutritional consult as needed  - Establish a toileting routine/schedule  - Consider collaborating with pharmacy to review patient's medication profile  O comfortably in bed at this time. Call light within reach. Family at bedside. Will continue to monitor.

## 2019-06-27 NOTE — PLAN OF CARE
Problem: Patient/Family Goals  Goal: Patient/Family Long Term Goal  Description  Patient's Long Term Goal: Manage her colostomy independently.      Interventions:  - Ostomy RN consult, watch educational videos  - See additional Care Plan goals for specifi function  - Maintain adequate hydration with IV or PO as ordered and tolerated  - Evaluate effectiveness of GI medications  - Encourage mobilization and activity  - Obtain nutritional consult as needed  - Establish a toileting routine/schedule  - Consider

## 2019-06-27 NOTE — PROGRESS NOTES
[facility]    Progress Note    Loren Lowry Patient Status:  Inpatient    1950 MRN [id number]   [facility] 3NW-A Attending Teresa Henao MD   Hosp Day # 8 PCP Massimo Simental MD       SUBJECTIVE:  No CP, SOB, or N/V.TOLERAT Intravenous Q6H PRN   famoTIDine (PEPCID) injection 20 mg 20 mg Intravenous BID         Assessment  Patient Active Problem List:     Osteoarthrosis, unspecified whether generalized or localized, lower leg     Diverticulitis of both small and large intestin

## 2019-06-27 NOTE — ED PROVIDER NOTES
69-year-old female who was seen by me as well as by the physician assistant for abdominal pain and abnormal CT scan result. Patient was found to have a perforated diverticulum. Patient overall is nontoxic-appearing.   Patient does have some tenderness sup

## 2019-06-27 NOTE — CM/SW NOTE
Interdisciplinary Rounds: 06/27/19  Admitted: 6/19/2019 LOS: 8  Disciplines in attendance: charge nurse, staff nurse, CM, [address] and discharge plan reviewed. Home with family and [facility]. Active issues needing resolution prior to discharge: Ileus.

## 2019-06-27 NOTE — WOUND PROGRESS NOTE
[facility]  Inpatient Ostomy Progress Note    Adenike Parrish Lowry Patient Status:  Inpatient    1950 MRN [id number]   [facility] 3NW-A Attending Stephanie Vidal MD   Days in hospital 8 Primary Rosa Matos MD     History of Present participate in the care of your patient.     Derrell Wilkes RN, BSN, [facility] nurse  Pager [phone]

## 2019-06-27 NOTE — PROGRESS NOTES
INFECTIOUS DISEASE PROGRESS NOTE    Bren Lowry Patient Status:  Inpatient    1950 MRN [id number]   [facility] 3NW-A Attending Amadou Hoffman MD   [facility] Day # 8 PCP Vandana Aquino nondistended. Incision clean. + bowel sounds. Musculoskeletal: Full range of motion of all extremities. No swelling noted.   Integument: No rash    Laboratory Data:    Recent Labs   Lab 06/24/19  0516   RBC 3.16*   HGB 9.7*   HCT 30.7*   MCV 97.2   MCH 3 Tazobactam <=4 Sensitive      Trimethoprim/Sulfa <=20 Sensitive          Radiology: reviewed     ASSESSMENT/ PLAN:    1. Perforated diverticulitis with abscess, s/p LAR with colostomy on 6/19  - cx noted, 2 strains of E coli milena to cefoxitin.  anaer cx pend

## 2019-06-27 NOTE — PROGRESS NOTES
[facility]  Progress Note    Kenrick Lowry Patient Status:  Inpatient    1950 MRN [id number]   [facility] 3NW-A Attending Kim Ulloa MD   Hosp Day # 8 PCP Ravin Allison MD     Subjective:  Pt still with minimal ostomy out options.     Robby Del Rosario  6/27/2019  4:52 PM

## 2019-06-27 NOTE — DIETARY NOTE
NUTRITION FOLLOW UP ASSESSMENT    Pt does not meet malnutrition criteria.       NUTRITION DIAGNOSIS/PROBLEM:    Inadequate oral intake related to inability to consume sufficient energy as evidenced by diet intolerance, prolonged NPO - resolved     NUTRITION Pt's nutrition needs will be met via the most appropriate route   2. Pt's GI function returns   3.  Maintain healthy weight     MEDICATIONS:  Noted    LABS:  Noted    Pt is at moderate nutrition risk    FOLLOW-UP DATE:  Per risk protocol    Kaushal Blanton RD,

## 2019-06-27 NOTE — PLAN OF CARE
Upon assessment, VSS, afebrile. Pt rates pain to abdomen 2/10, declining need for pain med currently. Abdomen is soft, nondistended and tender. Bowel sounds present x4. Pt denies nausea and tolerating low residue diet.  Flatus noted in colostomy appliance a interventions unsuccessful or patient reports new pain  - Anticipate increased pain with activity and pre-medicate as appropriate  6/27/2019 1404 by Rubina Pacheco RN  Outcome: Progressing  6/27/2019 1356 by Rubina Pacheco RN  Outcome: Progressing level of independence in self care  Description  Interventions:  - Assess ability and encourage patient to participate in ADLs to maximize function  - Promote sitting position while performing ADLs such as feeding, grooming, and bathing  - Educate and enco

## 2019-06-28 RX ORDER — LEVOFLOXACIN 750 MG/1
750 TABLET ORAL DAILY
Qty: 5 TABLET | Refills: 0 | Status: SHIPPED | OUTPATIENT
Start: 2019-06-28 | End: 2019-07-03

## 2019-06-28 RX ORDER — METRONIDAZOLE 500 MG/1
500 TABLET ORAL 3 TIMES DAILY
Qty: 15 TABLET | Refills: 0 | Status: SHIPPED | OUTPATIENT
Start: 2019-06-28 | End: 2019-07-03

## 2019-06-28 NOTE — PROGRESS NOTES
[facility]  Progress Note    Unruly Lowry Patient Status:  Inpatient    1950 MRN [id number]   [facility] 3NW-A Attending Peewee Bucio MD   [facility] Day # 9 PCP Will Downey MD     Subjective:  Pt continues to deny nausea, anil

## 2019-06-28 NOTE — PROGRESS NOTES
[facility]    Progress Note    Cony Lowry Patient Status:  Inpatient    1950 MRN [id number]   [facility] 3NW-A Attending Irma Turner MD   Hosp Day # 9 PCP Anai Frazier MD       SUBJECTIVE:  TOLERATING P.O.  AND AMBULA 5000 UNIT/ML injection 5,000 Units 5,000 Units Subcutaneous Q8H [address]   ondansetron HCl (ZOFRAN) injection 4 mg 4 mg Intravenous Q6H PRN   famoTIDine (PEPCID) injection 20 mg 20 mg Intravenous BID         Assessment  Patient Active Problem List:     Oralia Bardales

## 2019-06-28 NOTE — PLAN OF CARE
Problem: PAIN - ADULT  Goal: Verbalizes/displays adequate comfort level or patient's stated pain goal  Description  INTERVENTIONS:  - Encourage pt to monitor pain and request assistance  - Assess pain using appropriate pain scale  - Administer analgesics engage patient/family in tolerated activity level and precautions  - Up to chair x 3 daily   Outcome: Progressing     Problem: SKIN/TISSUE INTEGRITY - ADULT  Goal: Incision(s), wounds(s) or drain site(s) healing without S/S of infection  Description  INTER

## 2019-06-28 NOTE — PLAN OF CARE
Assumed care of pt at [address] [place] [address]. A&Ox4, VSS on RA. Afebrile, IV abx administered. Tolerating a soft diet, denies any nausea. Abd incision with staples, KELTON. Redness noted along incision. Abd soft, slightly tender. Right TONNY with serous drainage.  Gas present in o dressing/incision, wound bed, drain sites and surrounding tissue  - Implement wound care per orders  - Initiate isolation precautions as appropriate  - Initiate Pressure Ulcer prevention bundle as indicated  Outcome: Not Progressing     Problem: Impaired F

## 2019-06-28 NOTE — CM/SW NOTE
Pt will go home today with [facility] if she has a BM. Chong Sanchez from [facility] aware.

## 2019-06-28 NOTE — PROGRESS NOTES
INFECTIOUS DISEASE PROGRESS NOTE    Cony Hemant Lowry Patient Status:  Inpatient    1950 MRN [id number]   [facility] 3NW-A Attending Irma Turner MD   [facility] Day # 9 PCP Shanti Culp wheezes. No rhonchi. Cardiovascular: S1, S2.  Regular rate and rhythm. No murmurs. Abdomen: Soft, nontender, nondistended. Incision clean. + bowel sounds. Musculoskeletal: Full range of motion of all extremities. No swelling noted.   Integument: No ra Piperacillin + Tazobactam <=4 Sensitive      Trimethoprim/Sulfa <=20 Sensitive          Radiology: reviewed     ASSESSMENT/ PLAN:    1.  Perforated diverticulitis with abscess, s/p LAR with colostomy on 6/19  - cx noted, 2 strains of E coli milena to cefoxitin

## 2019-06-29 VITALS
RESPIRATION RATE: 16 BRPM | BODY MASS INDEX: 28.93 KG/M2 | TEMPERATURE: 98 F | HEIGHT: 66 IN | DIASTOLIC BLOOD PRESSURE: 79 MMHG | OXYGEN SATURATION: 98 % | SYSTOLIC BLOOD PRESSURE: 126 MMHG | HEART RATE: 71 BPM | WEIGHT: 180 LBS

## 2019-06-29 NOTE — CM/SW NOTE
06/29/19 1000   Discharge disposition   Expected discharge disposition Home or Self   Name of Facillity/Home Care/Hospice Residential   [facility] and informed them the pt will dc today.

## 2019-06-29 NOTE — DISCHARGE SUMMARY
[facility]  Discharge Summary    Vanessa Lowry Patient Status:  Inpatient    1950 MRN [id number]   [facility] 3NW-A Attending Suresh Youssef MD   Hosp Day # 10 PCP Delano Rojas MD     Date of Admission: 2019    Date of tablet (500 mg total) by mouth 3 (three) times daily for 5 days. Qty: 15 tablet Refills: 0      CONTINUE these medications which have NOT CHANGED    Nebivolol HCl (BYSTOLIC) 10 MG Oral Tab  Take 5 mg by mouth 2 (two) times daily.       hydrochlorothiazide

## 2019-06-29 NOTE — DISCHARGE SUMMARY
[facility]  Discharge Summary    Holden Lowry Patient Status:  Inpatient    1950 MRN [id number]   [facility] 3NW-A Attending Romelia Belcher MD   Hosp Day # 10 PCP Cinthia Fonseca MD     Date of Admission: 2019    Date of Blas Khan    Complications: none     Disposition: Home to self care   Discharge Condition: Good    Discharge Medications: Current Discharge Medication List    START taking these medications    levofloxacin 750 MG Oral Tab  Take 1 tablet (750 mg total) by

## 2019-06-29 NOTE — PROGRESS NOTES
[facility]  Progress Note    Demond Vallejo Lowry Patient Status:  Inpatient    1950 MRN [id number]   [facility] 3NW-A Attending Sonia Morales MD   Hosp Day # 10 PCP Yajaira Tineo MD     Subjective:  No new complaints, minimal inci than half of our visit was spent in counseling the patient on the above listed diagnoses and treatment options.     Ovalo Bench  6/29/2019  8:58 AM    Patient seen and examined  Home today  Instructions given  Home with the drain  See us next week for p

## 2019-06-29 NOTE — PLAN OF CARE
Problem: PAIN - ADULT  Goal: Verbalizes/displays adequate comfort level or patient's stated pain goal  Description  INTERVENTIONS:  - Encourage pt to monitor pain and request assistance  - Assess pain using appropriate pain scale  - Administer analgesics by John Caldera RN  Outcome: Not Progressing     Problem: GASTROINTESTINAL - ADULT  Goal: Minimal or absence of nausea and vomiting  Description  INTERVENTIONS:  - Maintain adequate hydration with IV or PO as ordered and tolerated  - Nasogastric tube t feeding, grooming, and bathing  - Educate and encourage patient/family in tolerated functional activity level and precautions during self-care     6/28/2019 2330 by Mili Montague RN  Outcome: Progressing  6/28/2019 2330 by Mili Montague RN  Outcome:

## 2019-07-01 ENCOUNTER — TELEPHONE (OUTPATIENT)
Dept: SURGERY | Facility: CLINIC | Age: 69
End: 2019-07-01

## 2019-07-09 ENCOUNTER — OFFICE VISIT (OUTPATIENT)
Dept: SURGERY | Facility: CLINIC | Age: 69
End: 2019-07-09

## 2019-07-09 VITALS
BODY MASS INDEX: 31 KG/M2 | WEIGHT: 191 LBS | HEART RATE: 68 BPM | TEMPERATURE: 98 F | DIASTOLIC BLOOD PRESSURE: 80 MMHG | SYSTOLIC BLOOD PRESSURE: 114 MMHG

## 2019-07-09 DIAGNOSIS — K57.40 DIVERTICULITIS OF BOTH SMALL AND LARGE INTESTINE WITH PERFORATION AND ABSCESS: Primary | ICD-10-CM

## 2019-07-09 PROCEDURE — 99024 POSTOP FOLLOW-UP VISIT: CPT | Performed by: PHYSICIAN ASSISTANT

## 2019-07-09 NOTE — PROGRESS NOTES
Post Operative Visit Note       Active Problems  1.  Diverticulitis of both small and large intestine with perforation and abscess         Chief Complaint   Patient presents with:  Post-Op: PO LOW ANTERIOR RESECTION OF RECTAL SIGMOID COLON, DRAINAGE OF PELV MATRL FOR VOCAL CORD     • OTHER  Dec 2013    Lt wrist    • REMOVAL OF TONSILS,12+ Y/O     • TOTAL ABDOM HYSTERECTOMY         The family history and social history have been reviewed by me today. History reviewed. No pertinent family history.   Social Hi adenopathy. Does not bruise/bleed easily. Psychiatric/Behavioral: Negative for behavioral problems and sleep disturbance.        Physical Findings   /80   Pulse 68   Temp 98.1 °F (36.7 °C) (Oral)   Wt 191 lb   BMI 30.83 kg/m²   Physical Exam   Const rash noted. She is not diaphoretic. Psychiatric: She has a normal mood and affect. Her behavior is normal. Judgment and thought content normal.   Nursing note and vitals reviewed.         Pathology:  A- Portion of omentum:  -Mature adipose tissue with org agreement with the plan of care. No orders of the defined types were placed in this encounter. Imaging & Referrals   None    Follow Up  Return in 2 days (on 7/11/2019).     Chet Israel PA-C

## 2019-07-11 ENCOUNTER — OFFICE VISIT (OUTPATIENT)
Dept: SURGERY | Facility: CLINIC | Age: 69
End: 2019-07-11

## 2019-07-11 VITALS
SYSTOLIC BLOOD PRESSURE: 126 MMHG | WEIGHT: 191 LBS | HEART RATE: 68 BPM | TEMPERATURE: 98 F | BODY MASS INDEX: 31 KG/M2 | DIASTOLIC BLOOD PRESSURE: 78 MMHG

## 2019-07-11 DIAGNOSIS — K57.92 ACUTE DIVERTICULITIS: ICD-10-CM

## 2019-07-11 DIAGNOSIS — K57.40 DIVERTICULITIS OF BOTH SMALL AND LARGE INTESTINE WITH PERFORATION AND ABSCESS: Primary | ICD-10-CM

## 2019-07-11 PROCEDURE — 99024 POSTOP FOLLOW-UP VISIT: CPT | Performed by: PHYSICIAN ASSISTANT

## 2019-07-11 NOTE — PROGRESS NOTES
Post Operative Visit Note       Active Problems  1. Diverticulitis of both small and large intestine with perforation and abscess    2.  Acute diverticulitis         Chief Complaint   Patient presents with:  Post-Op: 6/19 Low Anterior Resection of the Recto file      Years of education: Not on file      Highest education level: Not on file    Tobacco Use      Smoking status: Never Smoker      Smokeless tobacco: Never Used    Substance and Sexual Activity      Alcohol use: Yes        Comment: occasionally HENT:   Head: Normocephalic and atraumatic. Eyes: Conjunctivae and EOM are normal. No scleral icterus. Neck: Normal range of motion. Neck supple. Pulmonary/Chest: Effort normal and breath sounds normal. No respiratory distress. Abdominal: Soft.  B

## 2019-07-18 ENCOUNTER — OFFICE VISIT (OUTPATIENT)
Dept: SURGERY | Facility: CLINIC | Age: 69
End: 2019-07-18

## 2019-07-18 ENCOUNTER — APPOINTMENT (OUTPATIENT)
Dept: LAB | Facility: HOSPITAL | Age: 69
End: 2019-07-18
Attending: COLON & RECTAL SURGERY
Payer: COMMERCIAL

## 2019-07-18 VITALS
HEART RATE: 57 BPM | TEMPERATURE: 99 F | BODY MASS INDEX: 31 KG/M2 | WEIGHT: 191 LBS | SYSTOLIC BLOOD PRESSURE: 118 MMHG | DIASTOLIC BLOOD PRESSURE: 77 MMHG

## 2019-07-18 DIAGNOSIS — R30.0 DYSURIA: ICD-10-CM

## 2019-07-18 DIAGNOSIS — K57.92 ACUTE DIVERTICULITIS: Primary | ICD-10-CM

## 2019-07-18 LAB
BILIRUB UR QL STRIP.AUTO: NEGATIVE
COLOR UR AUTO: YELLOW
GLUCOSE UR STRIP.AUTO-MCNC: NEGATIVE MG/DL
KETONES UR STRIP.AUTO-MCNC: NEGATIVE MG/DL
NITRITE UR QL STRIP.AUTO: NEGATIVE
PH UR STRIP.AUTO: 5 [PH] (ref 4.5–8)
PROT UR STRIP.AUTO-MCNC: NEGATIVE MG/DL
RBC UR QL AUTO: NEGATIVE
SP GR UR STRIP.AUTO: 1.01 (ref 1–1.03)
UROBILINOGEN UR STRIP.AUTO-MCNC: <2 MG/DL

## 2019-07-18 PROCEDURE — 87077 CULTURE AEROBIC IDENTIFY: CPT | Performed by: COLON & RECTAL SURGERY

## 2019-07-18 PROCEDURE — 81001 URINALYSIS AUTO W/SCOPE: CPT | Performed by: COLON & RECTAL SURGERY

## 2019-07-18 PROCEDURE — 87186 SC STD MICRODIL/AGAR DIL: CPT | Performed by: COLON & RECTAL SURGERY

## 2019-07-18 PROCEDURE — 99024 POSTOP FOLLOW-UP VISIT: CPT | Performed by: COLON & RECTAL SURGERY

## 2019-07-18 PROCEDURE — 87086 URINE CULTURE/COLONY COUNT: CPT | Performed by: COLON & RECTAL SURGERY

## 2019-07-18 NOTE — PATIENT INSTRUCTIONS
Marci John is a 76year old female underwent a low anterior resection of the rectosigmoid colon with Muna's pouch and end colostomy. Drainage of abdominal abscess. Right oophorectomy on June 19, 2019.     This patient is doing extremely well.  She c

## 2019-07-18 NOTE — PROGRESS NOTES
Post Operative Visit Note       Active Problems  1. Acute diverticulitis    2.  Dysuria         Chief Complaint   Patient presents with:  Post-Op: 3rd PO LAR, mild incisional pain, denies fever or chills, surgical site looks okay, c/o odor to urine, denies The family history and social history have been reviewed by me today. No family history on file.   Social History    Socioeconomic History      Marital status:       Spouse name: Not on file      Number of children: Not on file      Years o Skin: Skin is dry. Psychiatric: Her behavior is normal. Judgment normal.   Nursing note and vitals reviewed.           Assessment   Acute diverticulitis  (primary encounter diagnosis)  Sam Garay is a 76year old female underwent decision making for her colostomy take down in January 2020. She can resume full activity 10 weeks following the procedure without restrictions.            Orders Placed This Encounter      UA/M with Culture Reflex      Imaging & Referrals   None    Follow

## 2019-07-21 NOTE — PROGRESS NOTES
This patient's X-ray has been completed. If patient had an appointment when X-ray results were available, no action is necessary. If there was no appointment when results were obtained, please schedule an appointment.       Make sure patient is on appropr

## 2019-07-22 ENCOUNTER — TELEPHONE (OUTPATIENT)
Dept: SURGERY | Facility: CLINIC | Age: 69
End: 2019-07-22

## 2019-07-22 RX ORDER — NITROFURANTOIN 25; 75 MG/1; MG/1
100 CAPSULE ORAL 2 TIMES DAILY
Qty: 28 CAPSULE | Refills: 0 | Status: SHIPPED | OUTPATIENT
Start: 2019-07-22 | End: 2019-08-05

## 2019-07-22 NOTE — PROGRESS NOTES
Pt notified and placed on ABT per DJP. Told to make appt with PCP, voiced understanding and has no further questions.

## 2019-07-22 NOTE — TELEPHONE ENCOUNTER
Called pt and ordered proper ABT per DJP. Placed order and alerted pt and told pt to make an appt with Dr Montserrat Martinez. Pt voiced understanding and has no further questions. States will make an appt with PCP.

## 2019-08-27 PROBLEM — S83.241A ACUTE MEDIAL MENISCUS TEAR OF RIGHT KNEE: Status: ACTIVE | Noted: 2017-08-29

## 2019-08-27 PROBLEM — M17.11 PRIMARY OSTEOARTHRITIS OF RIGHT KNEE: Status: ACTIVE | Noted: 2017-08-10

## 2019-08-27 PROBLEM — M17.11 PATELLOFEMORAL ARTHRITIS OF RIGHT KNEE: Status: ACTIVE | Noted: 2017-08-10

## 2019-11-04 ENCOUNTER — OFFICE VISIT (OUTPATIENT)
Dept: CARDIOLOGY | Age: 69
End: 2019-11-04

## 2019-11-04 VITALS — HEART RATE: 64 BPM | DIASTOLIC BLOOD PRESSURE: 64 MMHG | SYSTOLIC BLOOD PRESSURE: 102 MMHG

## 2019-11-04 DIAGNOSIS — E78.00 PURE HYPERCHOLESTEROLEMIA: ICD-10-CM

## 2019-11-04 DIAGNOSIS — I10 ESSENTIAL HYPERTENSION, BENIGN: Primary | ICD-10-CM

## 2019-11-04 DIAGNOSIS — I65.23 ASYMPTOMATIC CAROTID ARTERY STENOSIS, BILATERAL: ICD-10-CM

## 2019-11-04 PROCEDURE — 3074F SYST BP LT 130 MM HG: CPT | Performed by: INTERNAL MEDICINE

## 2019-11-04 PROCEDURE — 3078F DIAST BP <80 MM HG: CPT | Performed by: INTERNAL MEDICINE

## 2019-11-04 PROCEDURE — 99214 OFFICE O/P EST MOD 30 MIN: CPT | Performed by: INTERNAL MEDICINE

## 2019-11-04 RX ORDER — NEBIVOLOL HYDROCHLORIDE 5 MG/1
5 TABLET ORAL 2 TIMES DAILY
Qty: 60 TABLET | Refills: 11 | Status: SHIPPED | OUTPATIENT
Start: 2019-11-04 | End: 2020-08-10 | Stop reason: SDUPTHER

## 2019-11-04 ASSESSMENT — PATIENT HEALTH QUESTIONNAIRE - PHQ9
1. LITTLE INTEREST OR PLEASURE IN DOING THINGS: NOT AT ALL
2. FEELING DOWN, DEPRESSED OR HOPELESS: NOT AT ALL
SUM OF ALL RESPONSES TO PHQ9 QUESTIONS 1 AND 2: 0
SUM OF ALL RESPONSES TO PHQ9 QUESTIONS 1 AND 2: 0

## 2019-11-11 ENCOUNTER — OFFICE VISIT (OUTPATIENT)
Dept: SURGERY | Facility: CLINIC | Age: 69
End: 2019-11-11
Payer: COMMERCIAL

## 2019-11-11 VITALS
BODY MASS INDEX: 29.38 KG/M2 | HEART RATE: 61 BPM | TEMPERATURE: 98 F | DIASTOLIC BLOOD PRESSURE: 68 MMHG | WEIGHT: 185 LBS | SYSTOLIC BLOOD PRESSURE: 106 MMHG | HEIGHT: 66.5 IN

## 2019-11-11 DIAGNOSIS — Z93.3 STATUS POST HARTMANN'S PROCEDURE (HCC): ICD-10-CM

## 2019-11-11 DIAGNOSIS — Z93.3 STATUS POST HARTMANN PROCEDURE (HCC): ICD-10-CM

## 2019-11-11 DIAGNOSIS — K57.40 DIVERTICULITIS OF BOTH SMALL AND LARGE INTESTINE WITH PERFORATION AND ABSCESS: ICD-10-CM

## 2019-11-11 DIAGNOSIS — K57.92 DIVERTICULITIS: Primary | ICD-10-CM

## 2019-11-11 PROBLEM — I10 UNSPECIFIED ESSENTIAL HYPERTENSION: Status: ACTIVE | Noted: 2019-11-11

## 2019-11-11 PROCEDURE — 99213 OFFICE O/P EST LOW 20 MIN: CPT | Performed by: COLON & RECTAL SURGERY

## 2019-11-11 RX ORDER — POLYETHYLENE GLYCOL 3350, SODIUM CHLORIDE, SODIUM BICARBONATE, POTASSIUM CHLORIDE 420; 11.2; 5.72; 1.48 G/4L; G/4L; G/4L; G/4L
POWDER, FOR SOLUTION ORAL
Qty: 1 BOTTLE | Refills: 0 | Status: ON HOLD | OUTPATIENT
Start: 2019-11-11 | End: 2020-02-05

## 2019-11-11 RX ORDER — METRONIDAZOLE 500 MG/1
TABLET ORAL
Qty: 3 TABLET | Refills: 0 | Status: ON HOLD | OUTPATIENT
Start: 2019-11-11 | End: 2020-02-05

## 2019-11-11 RX ORDER — NEBIVOLOL 5 MG/1
5 TABLET ORAL 2 TIMES DAILY
Status: ON HOLD | COMMUNITY
Start: 2019-11-04 | End: 2020-02-05

## 2019-11-11 RX ORDER — NEOMYCIN SULFATE 500 MG/1
TABLET ORAL
Qty: 6 TABLET | Refills: 0 | Status: ON HOLD | OUTPATIENT
Start: 2019-11-11 | End: 2020-02-05

## 2019-11-11 NOTE — PATIENT INSTRUCTIONS
The patient presents today for continued care and evaluation following a Muna procedure performed on June 19, 2019.     The patient's initial presentation to the ER is as listed below:  The patient states that 4 weeks ago she was visiting her daughter i well as a vaginal hysterectomy and a D&C    The patient has a past medical history significant for hypertension. The patient denies use of any form tobacco.  She rarely drinks alcohol. She works at home for a [employer].     Clinical examination of the

## 2019-11-11 NOTE — PROGRESS NOTES
Follow Up Visit Note       Active Problems      1. Diverticulitis    2. Status post Rozanna Abe procedure ([facility] [address]    3. Diverticulitis of both small and large intestine with perforation and abscess    4.  Status post Muna's procedure [facility])          Chief Com blood, mucus, or dark tarry stools. The patient had a visit with her gastroenterologist Dr. Lauren Gao and is scheduled to undergo a colonoscopy on November 22, 2019.     The patient has a past surgical history significant for an exploratory laparotomy, • TOTAL ABDOM HYSTERECTOMY         The family history and social history have been reviewed by me today. History reviewed. No pertinent family history.   Social History    Socioeconomic History      Marital status:       Spouse name: Not on arjun Right arm)   Pulse 61   Temp 97.9 °F (36.6 °C) (Oral)   Ht 66.5\"   Wt 185 lb (83.9 kg)   BMI 29.41 kg/m²   Physical Exam   Constitutional: She is oriented to person, place, and time. She appears well-developed and well-nourished. No distress.    HENT:   He is normal. Judgment and thought content normal.   Nursing note and vitals reviewed.        Assessment   Diverticulitis  (primary encounter diagnosis)  Status post Muna procedure [facility])  Diverticulitis of both small and large intestine with perforation an tarry stools. The patient had a visit with her gastroenterologist Dr. Irma Villalba and is scheduled to undergo a colonoscopy on November 22, 2019.     The patient has a past surgical history significant for an exploratory laparotomy, low anterior resectio Kofi Wen MD

## 2019-11-21 ENCOUNTER — TELEPHONE (OUTPATIENT)
Dept: SURGERY | Facility: CLINIC | Age: 69
End: 2019-11-21

## 2019-11-21 DIAGNOSIS — K57.32 DIVERTICULITIS OF LARGE INTESTINE, UNSPECIFIED BLEEDING STATUS, UNSPECIFIED COMPLICATION STATUS: Primary | ICD-10-CM

## 2019-11-22 PROBLEM — Z86.010 PERSONAL HISTORY OF COLONIC POLYPS: Status: ACTIVE | Noted: 2019-11-22

## 2019-11-22 PROBLEM — D12.0 BENIGN NEOPLASM OF CECUM: Status: ACTIVE | Noted: 2019-11-22

## 2019-11-22 PROBLEM — D12.3 BENIGN NEOPLASM OF TRANSVERSE COLON: Status: ACTIVE | Noted: 2019-11-22

## 2019-12-04 ENCOUNTER — DOCUMENTATION (OUTPATIENT)
Dept: CARDIOLOGY | Age: 69
End: 2019-12-04

## 2019-12-04 ENCOUNTER — HOSPITAL ENCOUNTER (OUTPATIENT)
Dept: CARDIOLOGY CLINIC | Facility: HOSPITAL | Age: 69
Discharge: HOME | End: 2019-12-04
Attending: INTERNAL MEDICINE
Payer: COMMERCIAL

## 2019-12-04 DIAGNOSIS — I65.23 ASYMPTOMATIC CAROTID ARTERY STENOSIS, BILATERAL: ICD-10-CM

## 2019-12-04 PROCEDURE — 93880 EXTRACRANIAL BILAT STUDY: CPT | Performed by: INTERNAL MEDICINE

## 2019-12-05 ENCOUNTER — HOSPITAL ENCOUNTER (OUTPATIENT)
Dept: CT IMAGING | Facility: HOSPITAL | Age: 69
Discharge: HOME | End: 2019-12-05
Attending: INTERNAL MEDICINE

## 2019-12-05 DIAGNOSIS — Z13.6 SCREENING FOR CARDIOVASCULAR CONDITION: ICD-10-CM

## 2019-12-05 DIAGNOSIS — I65.23 ASYMPTOMATIC CAROTID ARTERY STENOSIS, BILATERAL: ICD-10-CM

## 2019-12-09 ENCOUNTER — TELEPHONE (OUTPATIENT)
Dept: CARDIOLOGY | Age: 69
End: 2019-12-09

## 2019-12-17 ENCOUNTER — TELEPHONE (OUTPATIENT)
Dept: CARDIOLOGY | Age: 69
End: 2019-12-17

## 2020-01-17 ENCOUNTER — TELEPHONE (OUTPATIENT)
Dept: SURGERY | Facility: CLINIC | Age: 70
End: 2020-01-17

## 2020-01-21 RX ORDER — SODIUM CHLORIDE, SODIUM LACTATE, POTASSIUM CHLORIDE, CALCIUM CHLORIDE 600; 310; 30; 20 MG/100ML; MG/100ML; MG/100ML; MG/100ML
INJECTION, SOLUTION INTRAVENOUS CONTINUOUS
Status: CANCELLED | OUTPATIENT
Start: 2020-01-21

## 2020-01-21 RX ORDER — ACETAMINOPHEN 500 MG
1000 TABLET ORAL ONCE
Status: CANCELLED | OUTPATIENT
Start: 2020-01-21 | End: 2020-01-21

## 2020-01-21 RX ORDER — SODIUM CHLORIDE 9 MG/ML
INJECTION, SOLUTION INTRAVENOUS CONTINUOUS
Status: CANCELLED | OUTPATIENT
Start: 2020-01-21

## 2020-01-21 RX ORDER — AMOXICILLIN 250 MG
CAPSULE ORAL DAILY
COMMUNITY

## 2020-01-21 RX ORDER — MENTHOL 5.8 MG/1
LOZENGE ORAL DAILY
COMMUNITY

## 2020-01-27 ENCOUNTER — APPOINTMENT (OUTPATIENT)
Dept: LAB | Facility: HOSPITAL | Age: 70
End: 2020-01-27
Payer: COMMERCIAL

## 2020-01-27 DIAGNOSIS — K57.92 DIVERTICULITIS: ICD-10-CM

## 2020-01-27 LAB
ANION GAP SERPL CALC-SCNC: 2 MMOL/L (ref 0–18)
ATRIAL RATE: 62 BPM
BUN BLD-MCNC: 14 MG/DL (ref 7–18)
BUN/CREAT SERPL: 21.9 (ref 10–20)
CALCIUM BLD-MCNC: 9.3 MG/DL (ref 8.5–10.1)
CHLORIDE SERPL-SCNC: 107 MMOL/L (ref 98–112)
CO2 SERPL-SCNC: 30 MMOL/L (ref 21–32)
CREAT BLD-MCNC: 0.64 MG/DL (ref 0.55–1.02)
DEPRECATED RDW RBC AUTO: 51.8 FL (ref 35.1–46.3)
ERYTHROCYTE [DISTWIDTH] IN BLOOD BY AUTOMATED COUNT: 14.3 % (ref 11–15)
GLUCOSE BLD-MCNC: 98 MG/DL (ref 70–99)
HCT VFR BLD AUTO: 42.5 % (ref 35–48)
HGB BLD-MCNC: 13.4 G/DL (ref 12–16)
MCH RBC QN AUTO: 30.7 PG (ref 26–34)
MCHC RBC AUTO-ENTMCNC: 31.5 G/DL (ref 31–37)
MCV RBC AUTO: 97.5 FL (ref 80–100)
OSMOLALITY SERPL CALC.SUM OF ELEC: 288 MOSM/KG (ref 275–295)
P AXIS: 45 DEGREES
P-R INTERVAL: 144 MS
PATIENT FASTING Y/N/NP: YES
PLATELET # BLD AUTO: 349 10(3)UL (ref 150–450)
POTASSIUM SERPL-SCNC: 4.1 MMOL/L (ref 3.5–5.1)
Q-T INTERVAL: 408 MS
QRS DURATION: 86 MS
QTC CALCULATION (BEZET): 414 MS
R AXIS: 47 DEGREES
RBC # BLD AUTO: 4.36 X10(6)UL (ref 3.8–5.3)
SODIUM SERPL-SCNC: 139 MMOL/L (ref 136–145)
T AXIS: 16 DEGREES
VENTRICULAR RATE: 62 BPM
WBC # BLD AUTO: 6.3 X10(3) UL (ref 4–11)

## 2020-01-27 PROCEDURE — 93010 ELECTROCARDIOGRAM REPORT: CPT | Performed by: INTERNAL MEDICINE

## 2020-01-27 PROCEDURE — 93005 ELECTROCARDIOGRAM TRACING: CPT

## 2020-01-27 PROCEDURE — 80048 BASIC METABOLIC PNL TOTAL CA: CPT

## 2020-01-27 PROCEDURE — 36415 COLL VENOUS BLD VENIPUNCTURE: CPT

## 2020-01-27 PROCEDURE — 85027 COMPLETE CBC AUTOMATED: CPT

## 2020-01-30 ENCOUNTER — TELEPHONE (OUTPATIENT)
Dept: SURGERY | Facility: CLINIC | Age: 70
End: 2020-01-30

## 2020-01-30 NOTE — TELEPHONE ENCOUNTER
Pt called concerned that her colostomy reversal was not scheduled until 5:30 PM tomorrow January 31. Pt stated Rohit Khan is nearly 79years old and being on clear liquids today, drinking 4 liters tonight, and then no food all day tomorrow seems inhumane. \"  T

## 2020-01-31 ENCOUNTER — HOSPITAL ENCOUNTER (INPATIENT)
Facility: HOSPITAL | Age: 70
LOS: 5 days | Discharge: HOME | DRG: 330 | End: 2020-02-05
Attending: COLON & RECTAL SURGERY | Admitting: COLON & RECTAL SURGERY
Payer: COMMERCIAL

## 2020-01-31 ENCOUNTER — ANESTHESIA EVENT (OUTPATIENT)
Dept: SURGERY | Facility: HOSPITAL | Age: 70
DRG: 330 | End: 2020-01-31
Payer: COMMERCIAL

## 2020-01-31 ENCOUNTER — ANESTHESIA (OUTPATIENT)
Dept: SURGERY | Facility: HOSPITAL | Age: 70
DRG: 330 | End: 2020-01-31
Payer: COMMERCIAL

## 2020-01-31 DIAGNOSIS — Z93.3 STATUS POST HARTMANN PROCEDURE (HCC): ICD-10-CM

## 2020-01-31 DIAGNOSIS — K57.92 DIVERTICULITIS: Primary | ICD-10-CM

## 2020-01-31 PROCEDURE — 0DNE0ZZ RELEASE LARGE INTESTINE, OPEN APPROACH: ICD-10-PCS | Performed by: COLON & RECTAL SURGERY

## 2020-01-31 PROCEDURE — 0DBM0ZZ EXCISION OF DESCENDING COLON, OPEN APPROACH: ICD-10-PCS | Performed by: COLON & RECTAL SURGERY

## 2020-01-31 RX ORDER — ACETAMINOPHEN 500 MG
1000 TABLET ORAL EVERY 8 HOURS
Status: DISCONTINUED | OUTPATIENT
Start: 2020-01-31 | End: 2020-02-05

## 2020-01-31 RX ORDER — DEXTROSE AND SODIUM CHLORIDE 5; .45 G/100ML; G/100ML
INJECTION, SOLUTION INTRAVENOUS CONTINUOUS
Status: DISCONTINUED | OUTPATIENT
Start: 2020-01-31 | End: 2020-02-04

## 2020-01-31 RX ORDER — ONDANSETRON 2 MG/ML
4 INJECTION INTRAMUSCULAR; INTRAVENOUS EVERY 4 HOURS PRN
Status: DISCONTINUED | OUTPATIENT
Start: 2020-01-31 | End: 2020-01-31

## 2020-01-31 RX ORDER — CLONIDINE 100 UG/ML
INJECTION, SOLUTION EPIDURAL AS NEEDED
Status: DISCONTINUED | OUTPATIENT
Start: 2020-01-31 | End: 2020-01-31 | Stop reason: SURG

## 2020-01-31 RX ORDER — POLYETHYLENE GLYCOL 3350 17 G/17G
17 POWDER, FOR SOLUTION ORAL DAILY PRN
Status: DISCONTINUED | OUTPATIENT
Start: 2020-01-31 | End: 2020-02-04

## 2020-01-31 RX ORDER — FAMOTIDINE 20 MG/1
20 TABLET ORAL 2 TIMES DAILY
Status: DISCONTINUED | OUTPATIENT
Start: 2020-01-31 | End: 2020-02-05

## 2020-01-31 RX ORDER — HYDROMORPHONE HYDROCHLORIDE 1 MG/ML
0.4 INJECTION, SOLUTION INTRAMUSCULAR; INTRAVENOUS; SUBCUTANEOUS EVERY 5 MIN PRN
Status: DISCONTINUED | OUTPATIENT
Start: 2020-01-31 | End: 2020-01-31 | Stop reason: HOSPADM

## 2020-01-31 RX ORDER — BUPIVACAINE HYDROCHLORIDE 2.5 MG/ML
INJECTION, SOLUTION EPIDURAL; INFILTRATION; INTRACAUDAL AS NEEDED
Status: DISCONTINUED | OUTPATIENT
Start: 2020-01-31 | End: 2020-01-31 | Stop reason: SURG

## 2020-01-31 RX ORDER — ACETAMINOPHEN 500 MG
1000 TABLET ORAL EVERY 6 HOURS PRN
COMMUNITY
End: 2021-02-24

## 2020-01-31 RX ORDER — NEOSTIGMINE METHYLSULFATE 1 MG/ML
INJECTION INTRAVENOUS AS NEEDED
Status: DISCONTINUED | OUTPATIENT
Start: 2020-01-31 | End: 2020-01-31 | Stop reason: SURG

## 2020-01-31 RX ORDER — DIPHENHYDRAMINE HYDROCHLORIDE 50 MG/ML
12.5 INJECTION INTRAMUSCULAR; INTRAVENOUS EVERY 4 HOURS PRN
Status: DISCONTINUED | OUTPATIENT
Start: 2020-01-31 | End: 2020-02-04

## 2020-01-31 RX ORDER — EPHEDRINE SULFATE 50 MG/ML
5 INJECTION, SOLUTION INTRAVENOUS EVERY 10 MIN PRN
Status: DISCONTINUED | OUTPATIENT
Start: 2020-01-31 | End: 2020-01-31 | Stop reason: HOSPADM

## 2020-01-31 RX ORDER — ROCURONIUM BROMIDE 10 MG/ML
INJECTION, SOLUTION INTRAVENOUS AS NEEDED
Status: DISCONTINUED | OUTPATIENT
Start: 2020-01-31 | End: 2020-01-31 | Stop reason: SURG

## 2020-01-31 RX ORDER — METRONIDAZOLE 500 MG/100ML
500 INJECTION, SOLUTION INTRAVENOUS ONCE
Status: COMPLETED | OUTPATIENT
Start: 2020-01-31 | End: 2020-01-31

## 2020-01-31 RX ORDER — HEPARIN SODIUM 5000 [USP'U]/ML
5000 INJECTION, SOLUTION INTRAVENOUS; SUBCUTANEOUS ONCE
Status: COMPLETED | OUTPATIENT
Start: 2020-01-31 | End: 2020-01-31

## 2020-01-31 RX ORDER — SODIUM CHLORIDE, SODIUM LACTATE, POTASSIUM CHLORIDE, CALCIUM CHLORIDE 600; 310; 30; 20 MG/100ML; MG/100ML; MG/100ML; MG/100ML
INJECTION, SOLUTION INTRAVENOUS CONTINUOUS PRN
Status: DISCONTINUED | OUTPATIENT
Start: 2020-01-31 | End: 2020-01-31 | Stop reason: SURG

## 2020-01-31 RX ORDER — NEBIVOLOL 5 MG/1
5 TABLET ORAL
Status: DISCONTINUED | OUTPATIENT
Start: 2020-01-31 | End: 2020-02-05

## 2020-01-31 RX ORDER — HYDROMORPHONE HYDROCHLORIDE 1 MG/ML
0.2 INJECTION, SOLUTION INTRAMUSCULAR; INTRAVENOUS; SUBCUTANEOUS EVERY 2 HOUR PRN
Status: DISCONTINUED | OUTPATIENT
Start: 2020-01-31 | End: 2020-01-31

## 2020-01-31 RX ORDER — ONDANSETRON 2 MG/ML
4 INJECTION INTRAMUSCULAR; INTRAVENOUS EVERY 6 HOURS PRN
Status: DISCONTINUED | OUTPATIENT
Start: 2020-01-31 | End: 2020-02-01

## 2020-01-31 RX ORDER — LIDOCAINE HYDROCHLORIDE 10 MG/ML
INJECTION, SOLUTION EPIDURAL; INFILTRATION; INTRACAUDAL; PERINEURAL AS NEEDED
Status: DISCONTINUED | OUTPATIENT
Start: 2020-01-31 | End: 2020-01-31 | Stop reason: SURG

## 2020-01-31 RX ORDER — ONDANSETRON 2 MG/ML
INJECTION INTRAMUSCULAR; INTRAVENOUS AS NEEDED
Status: DISCONTINUED | OUTPATIENT
Start: 2020-01-31 | End: 2020-01-31 | Stop reason: SURG

## 2020-01-31 RX ORDER — BUPRENORPHINE HYDROCHLORIDE 0.32 MG/ML
INJECTION INTRAMUSCULAR; INTRAVENOUS AS NEEDED
Status: DISCONTINUED | OUTPATIENT
Start: 2020-01-31 | End: 2020-01-31 | Stop reason: SURG

## 2020-01-31 RX ORDER — HEPARIN SODIUM 5000 [USP'U]/ML
5000 INJECTION, SOLUTION INTRAVENOUS; SUBCUTANEOUS EVERY 8 HOURS SCHEDULED
Status: DISCONTINUED | OUTPATIENT
Start: 2020-02-01 | End: 2020-02-05

## 2020-01-31 RX ORDER — FAMOTIDINE 10 MG/ML
20 INJECTION, SOLUTION INTRAVENOUS 2 TIMES DAILY
Status: DISCONTINUED | OUTPATIENT
Start: 2020-01-31 | End: 2020-02-05

## 2020-01-31 RX ORDER — NALOXONE HYDROCHLORIDE 0.4 MG/ML
80 INJECTION, SOLUTION INTRAMUSCULAR; INTRAVENOUS; SUBCUTANEOUS AS NEEDED
Status: DISCONTINUED | OUTPATIENT
Start: 2020-01-31 | End: 2020-01-31 | Stop reason: HOSPADM

## 2020-01-31 RX ORDER — PHENYLEPHRINE HCL 10 MG/ML
VIAL (ML) INJECTION AS NEEDED
Status: DISCONTINUED | OUTPATIENT
Start: 2020-01-31 | End: 2020-01-31 | Stop reason: SURG

## 2020-01-31 RX ORDER — DEXAMETHASONE SODIUM PHOSPHATE 4 MG/ML
VIAL (ML) INJECTION AS NEEDED
Status: DISCONTINUED | OUTPATIENT
Start: 2020-01-31 | End: 2020-01-31 | Stop reason: SURG

## 2020-01-31 RX ORDER — HYDROCODONE BITARTRATE AND ACETAMINOPHEN 5; 325 MG/1; MG/1
2 TABLET ORAL EVERY 4 HOURS PRN
Status: DISCONTINUED | OUTPATIENT
Start: 2020-01-31 | End: 2020-02-05

## 2020-01-31 RX ORDER — KETAMINE HYDROCHLORIDE 50 MG/ML
INJECTION, SOLUTION, CONCENTRATE INTRAMUSCULAR; INTRAVENOUS AS NEEDED
Status: DISCONTINUED | OUTPATIENT
Start: 2020-01-31 | End: 2020-01-31 | Stop reason: SURG

## 2020-01-31 RX ORDER — ACETAMINOPHEN 500 MG
1000 TABLET ORAL ONCE
Status: DISCONTINUED | OUTPATIENT
Start: 2020-01-31 | End: 2020-02-05

## 2020-01-31 RX ORDER — HYDROMORPHONE HYDROCHLORIDE 1 MG/ML
0.4 INJECTION, SOLUTION INTRAMUSCULAR; INTRAVENOUS; SUBCUTANEOUS EVERY 2 HOUR PRN
Status: DISCONTINUED | OUTPATIENT
Start: 2020-01-31 | End: 2020-01-31

## 2020-01-31 RX ORDER — SODIUM CHLORIDE, SODIUM LACTATE, POTASSIUM CHLORIDE, CALCIUM CHLORIDE 600; 310; 30; 20 MG/100ML; MG/100ML; MG/100ML; MG/100ML
INJECTION, SOLUTION INTRAVENOUS CONTINUOUS
Status: DISCONTINUED | OUTPATIENT
Start: 2020-01-31 | End: 2020-01-31 | Stop reason: HOSPADM

## 2020-01-31 RX ORDER — GLYCOPYRROLATE 0.2 MG/ML
INJECTION, SOLUTION INTRAMUSCULAR; INTRAVENOUS AS NEEDED
Status: DISCONTINUED | OUTPATIENT
Start: 2020-01-31 | End: 2020-01-31 | Stop reason: SURG

## 2020-01-31 RX ORDER — EPHEDRINE SULFATE 50 MG/ML
INJECTION, SOLUTION INTRAVENOUS AS NEEDED
Status: DISCONTINUED | OUTPATIENT
Start: 2020-01-31 | End: 2020-01-31 | Stop reason: SURG

## 2020-01-31 RX ORDER — NALOXONE HYDROCHLORIDE 0.4 MG/ML
0.08 INJECTION, SOLUTION INTRAMUSCULAR; INTRAVENOUS; SUBCUTANEOUS
Status: DISCONTINUED | OUTPATIENT
Start: 2020-01-31 | End: 2020-02-04

## 2020-01-31 RX ORDER — HYDROCODONE BITARTRATE AND ACETAMINOPHEN 5; 325 MG/1; MG/1
1 TABLET ORAL EVERY 4 HOURS PRN
Status: DISCONTINUED | OUTPATIENT
Start: 2020-01-31 | End: 2020-02-05

## 2020-01-31 RX ORDER — HYDROMORPHONE HYDROCHLORIDE 1 MG/ML
0.3 INJECTION, SOLUTION INTRAMUSCULAR; INTRAVENOUS; SUBCUTANEOUS EVERY 2 HOUR PRN
Status: DISCONTINUED | OUTPATIENT
Start: 2020-01-31 | End: 2020-01-31

## 2020-01-31 RX ORDER — SODIUM CHLORIDE 9 MG/ML
INJECTION, SOLUTION INTRAVENOUS CONTINUOUS
Status: DISCONTINUED | OUTPATIENT
Start: 2020-01-31 | End: 2020-02-01

## 2020-01-31 RX ORDER — EPHEDRINE SULFATE 50 MG/ML
INJECTION, SOLUTION INTRAVENOUS
Status: COMPLETED
Start: 2020-01-31 | End: 2020-01-31

## 2020-01-31 RX ORDER — NALBUPHINE HCL 10 MG/ML
2.5 AMPUL (ML) INJECTION EVERY 4 HOURS PRN
Status: DISCONTINUED | OUTPATIENT
Start: 2020-01-31 | End: 2020-02-04

## 2020-01-31 RX ORDER — LABETALOL HYDROCHLORIDE 5 MG/ML
10 INJECTION, SOLUTION INTRAVENOUS EVERY 6 HOURS PRN
Status: DISCONTINUED | OUTPATIENT
Start: 2020-01-31 | End: 2020-02-05

## 2020-01-31 RX ORDER — MIDAZOLAM HYDROCHLORIDE 1 MG/ML
INJECTION INTRAMUSCULAR; INTRAVENOUS AS NEEDED
Status: DISCONTINUED | OUTPATIENT
Start: 2020-01-31 | End: 2020-01-31 | Stop reason: SURG

## 2020-01-31 RX ORDER — GABAPENTIN 100 MG/1
200 CAPSULE ORAL ONCE
Status: COMPLETED | OUTPATIENT
Start: 2020-01-31 | End: 2020-01-31

## 2020-01-31 RX ORDER — DOCUSATE SODIUM 100 MG/1
100 CAPSULE, LIQUID FILLED ORAL 2 TIMES DAILY
Status: DISCONTINUED | OUTPATIENT
Start: 2020-01-31 | End: 2020-02-04

## 2020-01-31 RX ORDER — DEXAMETHASONE SODIUM PHOSPHATE 10 MG/ML
INJECTION, SOLUTION INTRAMUSCULAR; INTRAVENOUS AS NEEDED
Status: DISCONTINUED | OUTPATIENT
Start: 2020-01-31 | End: 2020-01-31 | Stop reason: SURG

## 2020-01-31 RX ADMIN — EPHEDRINE SULFATE 5 MG: 50 INJECTION, SOLUTION INTRAVENOUS at 18:01:00

## 2020-01-31 RX ADMIN — ROCURONIUM BROMIDE 50 MG: 10 INJECTION, SOLUTION INTRAVENOUS at 17:42:00

## 2020-01-31 RX ADMIN — BUPIVACAINE HYDROCHLORIDE 60 ML: 2.5 INJECTION, SOLUTION EPIDURAL; INFILTRATION; INTRACAUDAL at 17:48:00

## 2020-01-31 RX ADMIN — GLYCOPYRROLATE 0.4 MG: 0.2 INJECTION, SOLUTION INTRAMUSCULAR; INTRAVENOUS at 19:56:00

## 2020-01-31 RX ADMIN — PHENYLEPHRINE HCL 50 MCG: 10 MG/ML VIAL (ML) INJECTION at 18:46:00

## 2020-01-31 RX ADMIN — PHENYLEPHRINE HCL 100 MCG: 10 MG/ML VIAL (ML) INJECTION at 18:25:00

## 2020-01-31 RX ADMIN — PHENYLEPHRINE HCL 100 MCG: 10 MG/ML VIAL (ML) INJECTION at 18:51:00

## 2020-01-31 RX ADMIN — LIDOCAINE HYDROCHLORIDE 25 MG: 10 INJECTION, SOLUTION EPIDURAL; INFILTRATION; INTRACAUDAL; PERINEURAL at 17:41:00

## 2020-01-31 RX ADMIN — GLYCOPYRROLATE 0.2 MG: 0.2 INJECTION, SOLUTION INTRAMUSCULAR; INTRAVENOUS at 18:19:00

## 2020-01-31 RX ADMIN — CLONIDINE 80 MCG: 100 INJECTION, SOLUTION EPIDURAL at 17:48:00

## 2020-01-31 RX ADMIN — BUPRENORPHINE HYDROCHLORIDE 0.15 MG: 0.32 INJECTION INTRAMUSCULAR; INTRAVENOUS at 17:48:00

## 2020-01-31 RX ADMIN — KETAMINE HYDROCHLORIDE 25 MG: 50 INJECTION, SOLUTION, CONCENTRATE INTRAMUSCULAR; INTRAVENOUS at 18:37:00

## 2020-01-31 RX ADMIN — DEXAMETHASONE SODIUM PHOSPHATE 4 MG: 10 INJECTION, SOLUTION INTRAMUSCULAR; INTRAVENOUS at 17:48:00

## 2020-01-31 RX ADMIN — MIDAZOLAM HYDROCHLORIDE 2 MG: 1 INJECTION INTRAMUSCULAR; INTRAVENOUS at 17:39:00

## 2020-01-31 RX ADMIN — NEOSTIGMINE METHYLSULFATE 3 MG: 1 INJECTION INTRAVENOUS at 19:56:00

## 2020-01-31 RX ADMIN — KETAMINE HYDROCHLORIDE 25 MG: 50 INJECTION, SOLUTION, CONCENTRATE INTRAMUSCULAR; INTRAVENOUS at 18:05:00

## 2020-01-31 RX ADMIN — DEXAMETHASONE SODIUM PHOSPHATE 8 MG: 4 MG/ML VIAL (ML) INJECTION at 17:59:00

## 2020-01-31 RX ADMIN — GLYCOPYRROLATE 0.1 MG: 0.2 INJECTION, SOLUTION INTRAMUSCULAR; INTRAVENOUS at 18:05:00

## 2020-01-31 RX ADMIN — ONDANSETRON 4 MG: 2 INJECTION INTRAMUSCULAR; INTRAVENOUS at 17:59:00

## 2020-01-31 RX ADMIN — METRONIDAZOLE 500 MG: 500 INJECTION, SOLUTION INTRAVENOUS at 17:52:00

## 2020-01-31 RX ADMIN — SODIUM CHLORIDE, SODIUM LACTATE, POTASSIUM CHLORIDE, CALCIUM CHLORIDE: 600; 310; 30; 20 INJECTION, SOLUTION INTRAVENOUS at 18:35:00

## 2020-01-31 NOTE — H&P
Active Problems      1. Diverticulitis    2.  Status post Muna procedure [facility])                  Chief Complaint   Diverticulitis with Echevarria's procedure        History of Present Illness  The patient presents today for continued care and evaluation fol Alcohol use:  Yes        Alcohol/week: 1.0 standard drinks        Types: 1 Glasses of wine per week        Comment: occasionally      Drug use: Never    Other Topics      Concerns:        Current Outpatient Medications:   •  Nebivolol HCl (BYSTOLIC) 5 M Clinical examination of the abdomen reveals it to be soft, nondistended, nontender, bowel sounds are normal activity normal pitch. There  is no rebounding tenderness or guarding. There are no signs of ascites or peritonitis.   The liver and spleen are non

## 2020-01-31 NOTE — ANESTHESIA PREPROCEDURE EVALUATION
PRE-OP EVALUATION    Patient Name: Haile Sabot    Pre-op Diagnosis: Diverticulitis [K57.92]  Status post Muna procedure [facility]) [Z93.3]    Procedure(s):  TAKEDOWN COLOSTOMY     Surgeon(s) and Role:     Radha Mcnally MD - Primary    Pre-op vitals rev • MATRL FOR VOCAL CORD     • OTHER  Dec 2013    Lt wrist    • PART REMOVAL COLON W END COLOSTOMY     • REMOVAL OF TONSILS,12+ Y/O     • TONSILLECTOMY     • TOTAL ABDOM HYSTERECTOMY       Social History    Tobacco Use      Smoking status: Never Smoker Comment: I explained the intrinsic risks of general anesthesia including PONV, sore throat and hoarseness from airway manipulation, post-operative pain/discomfort, dental /lip injury, pressure/nerve injuries from positioning, recall, and other serious but

## 2020-02-01 LAB
BASOPHILS # BLD AUTO: 0.02 X10(3) UL (ref 0–0.2)
BASOPHILS NFR BLD AUTO: 0.1 %
DEPRECATED RDW RBC AUTO: 53.4 FL (ref 35.1–46.3)
EOSINOPHIL # BLD AUTO: 0 X10(3) UL (ref 0–0.7)
EOSINOPHIL NFR BLD AUTO: 0 %
ERYTHROCYTE [DISTWIDTH] IN BLOOD BY AUTOMATED COUNT: 14.5 % (ref 11–15)
HCT VFR BLD AUTO: 34.6 % (ref 35–48)
HGB BLD-MCNC: 10.9 G/DL (ref 12–16)
IMM GRANULOCYTES # BLD AUTO: 0.07 X10(3) UL (ref 0–1)
IMM GRANULOCYTES NFR BLD: 0.4 %
LYMPHOCYTES # BLD AUTO: 1.04 X10(3) UL (ref 1–4)
LYMPHOCYTES NFR BLD AUTO: 6.2 %
MCH RBC QN AUTO: 31.3 PG (ref 26–34)
MCHC RBC AUTO-ENTMCNC: 31.5 G/DL (ref 31–37)
MCV RBC AUTO: 99.4 FL (ref 80–100)
MONOCYTES # BLD AUTO: 1.12 X10(3) UL (ref 0.1–1)
MONOCYTES NFR BLD AUTO: 6.7 %
NEUTROPHILS # BLD AUTO: 14.47 X10 (3) UL (ref 1.5–7.7)
NEUTROPHILS # BLD AUTO: 14.47 X10(3) UL (ref 1.5–7.7)
NEUTROPHILS NFR BLD AUTO: 86.6 %
PLATELET # BLD AUTO: 293 10(3)UL (ref 150–450)
RBC # BLD AUTO: 3.48 X10(6)UL (ref 3.8–5.3)
WBC # BLD AUTO: 16.7 X10(3) UL (ref 4–11)

## 2020-02-01 RX ORDER — LORAZEPAM 0.5 MG/1
0.5 TABLET ORAL EVERY 6 HOURS PRN
Status: DISCONTINUED | OUTPATIENT
Start: 2020-02-01 | End: 2020-02-05

## 2020-02-01 RX ORDER — ONDANSETRON 2 MG/ML
8 INJECTION INTRAMUSCULAR; INTRAVENOUS EVERY 6 HOURS PRN
Status: DISCONTINUED | OUTPATIENT
Start: 2020-02-01 | End: 2020-02-04

## 2020-02-01 NOTE — PLAN OF CARE
VSS. Patient voided 300 cc's. Abdomen soft, TONNY compressed. Dressing c/d/i. Denies pain. Tolerating sips of clears, 200 cc's apple juice total. Denies flatus, belching. RN encouraged IS, pillow given for splinting. Will continue to monitor.

## 2020-02-01 NOTE — ANESTHESIA PROCEDURE NOTES
Airway  Date/Time: 1/31/2020 5:44 PM  Urgency: elective      General Information and Staff    Patient location during procedure: OR  Anesthesiologist: Sally Diehl MD  Performed: anesthesiologist     Indications and Patient Condition  Indications for airw

## 2020-02-01 NOTE — ANESTHESIA PROCEDURE NOTES
TAP + subcostal  Performed by: Aneesh Benítez MD  Authorized by: Aneesh Benítez MD       General Information and Staff    Start Time:  1/31/2020 5:45 PM  End Time:  1/31/2020 5:48 PM  Anesthesiologist:  Aneesh Benítez MD  Performed by:   Anesthesiologist  Dannielle Buprenorphine + 20mcg Clonidine

## 2020-02-01 NOTE — CONSULTS
[facility] Pharmacy Note:  Pain Consult    Betsy Nava is a 71year old female started on Dilaudid PCA by Dr. Kate HERNANDEZ. Pharmacy was consulted to review medication profile and to discontinue previously ordered narcotics and sedatives.     Medicatio

## 2020-02-01 NOTE — PLAN OF CARE
Pt alert and orientatedx4. 2 liters of O2. Glasses. HOB>30. Pulse Ox. Respiratory to bring IS. Tele- NSR. SCDs. NPO w/ ice chips and sips. Pickett in place. Dliaudid PCA. Denies N/V. Left TONNY drain with SS output. ABD pads and tape over abdomen incision.  IVF reduce risk of injury  - Provide assistive devices as appropriate  - Consider OT/PT consult to assist with strengthening/mobility  - Encourage toileting schedule  Outcome: Progressing     Problem: DISCHARGE PLANNING  Goal: Discharge to home or other facili

## 2020-02-01 NOTE — ANESTHESIA POSTPROCEDURE EVALUATION
1975 [address] [place] [address] Patient Status:  Surgery Admit - Inpt   Age/Gender 71year old female MRN [id number]   [facility] SURGERY Attending Suki Zhang MD   Hosp Day # 0 PCP Eric Felix MD       Anesthesia Post-op Note    Pro

## 2020-02-01 NOTE — OPERATIVE REPORT
[facility]  Op Note    Jocelyn Bernard Location: OR   [facility] [id number] MRN [id number]   Admission Date 1/31/2020 Operation Date 1/31/2020   Attending Physician Ayah Hernandez MD Operating Physician Selene Donato MD     Pre-Operative Diagnosis: Diverticuliti xiphoid to pubis. The previous incision was reentered in the midline. The midline fascia was divided. The peritoneum was entered. Great care was taken to take down all previous adhesions from the prior operation.     Once we gained access to the abdomina External visualization of the anastomotic site revealed a tension-free anastomosis with good blood supply on the serosal surfaces. There is no disruption of the staple line.     We then thoroughly irrigated the abdomen with saline aspirating all blood clots

## 2020-02-01 NOTE — PLAN OF CARE
Problem: PAIN - ADULT  Goal: Verbalizes/displays adequate comfort level or patient's stated pain goal  Description  INTERVENTIONS:  - Encourage pt to monitor pain and request assistance  - Assess pain using appropriate pain scale  - Administer analgesics appropriate  - Identify discharge learning needs (meds, wound care, etc)  - Arrange for interpreters to assist at discharge as needed  - Consider post-discharge preferences of patient/family/discharge partner  - Complete POLST form as appropriate  - Assess S/S of infection  Description  INTERVENTIONS:  - Assess and document risk factors for pressure ulcer development  - Assess and document skin integrity  - Assess and document dressing/incision, wound bed, drain sites and surrounding tissue  - Implement woun

## 2020-02-01 NOTE — PLAN OF CARE
Patient resting in bed. Abdominal dressing with small amount of bloody drainage noted, RN changed dressing, CHG clothes, new dressing applied. Lungs clear, encouraged IS. Denies calf pain. SCDs in place. PCA in place. Will start sips of clears, slowly.  Nathan

## 2020-02-01 NOTE — CONSULTS
[facility]  Report of Consultation    Bren Lowry Patient Status:  Inpatient    1950 MRN [id number]   [facility] 3NW-A Attending Amadou Hoffman MD   Hosp Day # 1 PCP Jenny Schwarz MD     Reason for Consultation:  HTN, [facility] PEG 3350 (MIRALAX) powder packet 17 g, 17 g, Oral, Daily PRN  •  famoTIDine (PEPCID) tab 20 mg, 20 mg, Oral, BID **OR** famoTIDine (PEPCID) injection 20 mg, 20 mg, Intravenous, BID  •  HYDROcodone-acetaminophen (NORCO) 5-325 MG per tab 1 tablet, 1 tablet, [facility]     Unspecified essential hypertension     Personal history of colonic polyps     Benign neoplasm of cecum     Benign neoplasm of transverse colon          Recommendations:  HTN-CURRENTLY BP IS LOW.  MONITOR  PAIN CONTROL -ABRAHAM JARRELL

## 2020-02-01 NOTE — PLAN OF CARE
Patient sitting in chair. Blood pressure 75/44, 77/46, patient denies any lightheadedness/dizziness/ RN checked blood pressure 93/62. Patient voided 100 cc's, bladder scan 14 cc's urine. RN paged Dr. Ubaldo Farooq on call for Dr. Josefina Gracia.  Patient assisted back to b

## 2020-02-02 ENCOUNTER — APPOINTMENT (OUTPATIENT)
Dept: GENERAL RADIOLOGY | Facility: HOSPITAL | Age: 70
DRG: 330 | End: 2020-02-02
Attending: COLON & RECTAL SURGERY
Payer: COMMERCIAL

## 2020-02-02 PROCEDURE — 71045 X-RAY EXAM CHEST 1 VIEW: CPT | Performed by: COLON & RECTAL SURGERY

## 2020-02-02 PROCEDURE — 74019 RADEX ABDOMEN 2 VIEWS: CPT | Performed by: COLON & RECTAL SURGERY

## 2020-02-02 RX ORDER — METOCLOPRAMIDE HYDROCHLORIDE 5 MG/ML
10 INJECTION INTRAMUSCULAR; INTRAVENOUS EVERY 6 HOURS PRN
Status: DISCONTINUED | OUTPATIENT
Start: 2020-02-02 | End: 2020-02-05

## 2020-02-02 RX ORDER — METOCLOPRAMIDE 10 MG/1
10 TABLET ORAL EVERY 6 HOURS PRN
Status: DISCONTINUED | OUTPATIENT
Start: 2020-02-02 | End: 2020-02-05

## 2020-02-02 RX ORDER — PROCHLORPERAZINE EDISYLATE 5 MG/ML
5 INJECTION INTRAMUSCULAR; INTRAVENOUS EVERY 6 HOURS PRN
Status: DISCONTINUED | OUTPATIENT
Start: 2020-02-02 | End: 2020-02-04

## 2020-02-02 NOTE — CONSULTS
[facility]  Report of Consultation    Jacquelyn Lowry Patient Status:  Inpatient    1950 MRN [id number]   [facility] 3NW-A Attending Jorge A Loyd MD   Hosp Day # 2 PCP Tono Hay MD     Reason for Consultation:  HTN, [facility] %-0.45 % NaCl infusion, , Intravenous, Continuous  •  Heparin Sodium (Porcine) 5000 UNIT/ML injection 5,000 Units, 5,000 Units, Subcutaneous, Q8H [address]  •  acetaminophen (TYLENOL EXTRA STRENGTH) tab 1,000 mg, 1,000 mg, Oral, Q8H  •  docusate sodium (COLACE) Diverticulitis of both small and large intestine with perforation and abscess     Acute diverticulitis     Dysuria     Acute medial meniscus tear of right knee     Patellofemoral arthritis of right knee     Primary osteoarthritis of right knee     Status p

## 2020-02-02 NOTE — PLAN OF CARE
Patient sitting in chair. Complaints of \"slight nausea\" 50 cc's earlier this am. Patient did ambulate halls. Feels better after ambulation. 02 placed to help with nausea. TONNY emptied and compressed. Abdomen soft, hypo bowel sounds. Belching no flatus.  POC

## 2020-02-03 LAB
ALBUMIN SERPL-MCNC: 2.8 G/DL (ref 3.4–5)
ALBUMIN/GLOB SERPL: 0.9 {RATIO} (ref 1–2)
ALP LIVER SERPL-CCNC: 42 U/L (ref 55–142)
ALT SERPL-CCNC: 14 U/L (ref 13–56)
ANION GAP SERPL CALC-SCNC: 6 MMOL/L (ref 0–18)
AST SERPL-CCNC: 17 U/L (ref 15–37)
BASOPHILS # BLD AUTO: 0.02 X10(3) UL (ref 0–0.2)
BASOPHILS NFR BLD AUTO: 0.2 %
BILIRUB SERPL-MCNC: 0.6 MG/DL (ref 0.1–2)
BUN BLD-MCNC: 5 MG/DL (ref 7–18)
BUN/CREAT SERPL: 9.6 (ref 10–20)
CALCIUM BLD-MCNC: 8 MG/DL (ref 8.5–10.1)
CHLORIDE SERPL-SCNC: 103 MMOL/L (ref 98–112)
CO2 SERPL-SCNC: 29 MMOL/L (ref 21–32)
CREAT BLD-MCNC: 0.52 MG/DL (ref 0.55–1.02)
DEPRECATED RDW RBC AUTO: 52.3 FL (ref 35.1–46.3)
EOSINOPHIL # BLD AUTO: 0.02 X10(3) UL (ref 0–0.7)
EOSINOPHIL NFR BLD AUTO: 0.2 %
ERYTHROCYTE [DISTWIDTH] IN BLOOD BY AUTOMATED COUNT: 14.4 % (ref 11–15)
GLOBULIN PLAS-MCNC: 3.1 G/DL (ref 2.8–4.4)
GLUCOSE BLD-MCNC: 123 MG/DL (ref 70–99)
HCT VFR BLD AUTO: 32.7 % (ref 35–48)
HGB BLD-MCNC: 10.5 G/DL (ref 12–16)
IMM GRANULOCYTES # BLD AUTO: 0.06 X10(3) UL (ref 0–1)
IMM GRANULOCYTES NFR BLD: 0.5 %
LYMPHOCYTES # BLD AUTO: 1.58 X10(3) UL (ref 1–4)
LYMPHOCYTES NFR BLD AUTO: 13.9 %
M PROTEIN MFR SERPL ELPH: 5.9 G/DL (ref 6.4–8.2)
MCH RBC QN AUTO: 31.6 PG (ref 26–34)
MCHC RBC AUTO-ENTMCNC: 32.1 G/DL (ref 31–37)
MCV RBC AUTO: 98.5 FL (ref 80–100)
MONOCYTES # BLD AUTO: 0.82 X10(3) UL (ref 0.1–1)
MONOCYTES NFR BLD AUTO: 7.2 %
NEUTROPHILS # BLD AUTO: 8.84 X10 (3) UL (ref 1.5–7.7)
NEUTROPHILS # BLD AUTO: 8.84 X10(3) UL (ref 1.5–7.7)
NEUTROPHILS NFR BLD AUTO: 78 %
OSMOLALITY SERPL CALC.SUM OF ELEC: 285 MOSM/KG (ref 275–295)
PLATELET # BLD AUTO: 298 10(3)UL (ref 150–450)
POTASSIUM SERPL-SCNC: 3.5 MMOL/L (ref 3.5–5.1)
RBC # BLD AUTO: 3.32 X10(6)UL (ref 3.8–5.3)
SODIUM SERPL-SCNC: 138 MMOL/L (ref 136–145)
WBC # BLD AUTO: 11.3 X10(3) UL (ref 4–11)

## 2020-02-03 RX ORDER — ENALAPRILAT 2.5 MG/2ML
1.25 INJECTION INTRAVENOUS EVERY 6 HOURS PRN
Status: DISCONTINUED | OUTPATIENT
Start: 2020-02-03 | End: 2020-02-05

## 2020-02-03 RX ORDER — METOPROLOL TARTRATE 5 MG/5ML
5 INJECTION INTRAVENOUS EVERY 6 HOURS
Status: DISCONTINUED | OUTPATIENT
Start: 2020-02-03 | End: 2020-02-04

## 2020-02-03 NOTE — PLAN OF CARE
Patient is A/Ox4, Anxious. RA. Tele, NSR. Denies N/V so far this shift. NG tube to LIS, draining Brown, placement verified with XR, pt tolerating well. Pain controlled with PCA.  Up with stand by. Raza drain to bulb suction on left side, draining serosanguino Provide assistive devices as appropriate  - Consider OT/PT consult to assist with strengthening/mobility  - Encourage toileting schedule  Outcome: Progressing     Problem: DISCHARGE PLANNING  Goal: Discharge to home or other facility with appropriate resou patient's medication profile  Outcome: Progressing     Problem: SKIN/TISSUE INTEGRITY - ADULT  Goal: Skin integrity remains intact  Description  INTERVENTIONS  - Assess and document risk factors for pressure ulcer development  - Assess and document skin in

## 2020-02-03 NOTE — PLAN OF CARE
RN discussed with patient NG tube being placed. Patient asked RN advice, RN explained, patient has an ileus and placing an NG can help alleviate her symptoms. Patient agreed. Patient will continue to ambulate halls.      NG tube placed, patient tolerated we

## 2020-02-03 NOTE — PLAN OF CARE
NG discontinued per MD's orders, tip intact, patient tolerated well. Patient instructed to notify staff if she begins to feel nauseated, patient verbalizes understanding. Will continue to monitor patient.

## 2020-02-03 NOTE — PROGRESS NOTES
[facility]  Progress Note    Sarah Lowry Patient Status:  Inpatient    1950 MRN [id number]   [facility] 3NW-A Attending Clinton Bragg MD   Hosp Day # 3 PCP Prince Magalys MD     Subjective:  No new complaints, incisional pa localized, lower leg     Diverticulitis of both small and large intestine with perforation and abscess     Acute diverticulitis     Dysuria     Acute medial meniscus tear of right knee     Patellofemoral arthritis of right knee     Primary osteoarthritis o

## 2020-02-03 NOTE — PROGRESS NOTES
[facility]    Progress Note    Heladious Norma Lowry Patient Status:  Inpatient    1950 MRN [id number]   [facility] 3NW-A Attending Ron Yadav MD   Hosp Day # 3 PCP Luis Carlos Camarillo MD       SUBJECTIVE:  PT DEVELOPED ILEUS AND NG Intravenous, Q6H PRN  Prochlorperazine Edisylate (COMPAZINE) injection 5 mg, 5 mg, Intravenous, Q6H PRN  ondansetron HCl (ZOFRAN) injection 8 mg, 8 mg, Intravenous, Q6H PRN  LORazepam (ATIVAN) tab 0.5 mg, 0.5 mg, Oral, Q6H PRN  acetaminophen (TYLENOL EXTRA osteoarthritis of right knee     Status post Muna's procedure ([facility]     Unspecified essential hypertension     Personal history of colonic polyps     Benign neoplasm of cecum     Benign neoplasm of transverse colon      Plan:   COLOSTOMY TAKEDOWN-IMPRO

## 2020-02-03 NOTE — PROGRESS NOTES
[facility]  Progress Note    Cary Lowry Patient Status:  Inpatient    1950 MRN [id number]   [facility] 3NW-A Attending Yuriy Posada MD   Hosp Day # 2 PCP Sanaz Rivera MD     Subjective:  No new complaints.   She reports post Muna's procedure [facility])     Unspecified essential hypertension     Personal history of colonic polyps     Benign neoplasm of cecum     Benign neoplasm of transverse colon    Postop day #2 takedown of colostomy, partial colon resection      Plan:  1 Surgery  2/2/2020  7:37 PM

## 2020-02-04 LAB
POTASSIUM SERPL-SCNC: 3.5 MMOL/L (ref 3.5–5.1)
POTASSIUM SERPL-SCNC: 3.9 MMOL/L (ref 3.5–5.1)

## 2020-02-04 RX ORDER — POTASSIUM CHLORIDE 20 MEQ/1
40 TABLET, EXTENDED RELEASE ORAL EVERY 4 HOURS
Status: COMPLETED | OUTPATIENT
Start: 2020-02-04 | End: 2020-02-04

## 2020-02-04 NOTE — PROGRESS NOTES
[facility]  Progress Note    Ashley Lowry Patient Status:  Inpatient    1950 MRN [id number]   [facility] 3NW-A Attending Maurice Velazquez MD   Hosp Day # 4 PCP Andreina Vasquez MD     Subjective:  No new complaints, incisional pa colon    Expected Ileus resolving    Plan:  1. Ambulate, DVT prophylaxis, GI prophylaxis  2. Advance diet to general  3. May shower  4. Home tomorrow if stable    My total face time with this patient was 30 minutes.   Greater than half of our visit was spen

## 2020-02-04 NOTE — PROGRESS NOTES
[facility]    Progress Note    Unruly Lowry Patient Status:  Inpatient    1950 MRN [id number]   [facility] 3NW-A Attending Peewee Bucio MD   Hosp Day # 4 PCP Will Downey MD       SUBJECTIVE:  Diego Flair; ANOTHER BM    Nuria Body 1 tablet, Oral, Q4H PRN    Or  HYDROcodone-acetaminophen (NORCO) 5-325 MG per tab 2 tablet, 2 tablet, Oral, Q4H PRN  Labetalol HCl (TRANDATE) injection 10 mg, 10 mg, Intravenous, Q6H PRN  influenza vaccine (PF)(FLUZONE HD) high dose for 65 yrs & older inj

## 2020-02-05 VITALS
WEIGHT: 201.75 LBS | DIASTOLIC BLOOD PRESSURE: 76 MMHG | SYSTOLIC BLOOD PRESSURE: 127 MMHG | OXYGEN SATURATION: 100 % | RESPIRATION RATE: 18 BRPM | HEIGHT: 66 IN | HEART RATE: 88 BPM | TEMPERATURE: 98 F | BODY MASS INDEX: 32.42 KG/M2

## 2020-02-05 RX ORDER — FAMOTIDINE 20 MG/1
20 TABLET ORAL 2 TIMES DAILY
Qty: 60 TABLET | Refills: 0 | Status: SHIPPED | OUTPATIENT
Start: 2020-02-05 | End: 2021-02-24

## 2020-02-05 RX ORDER — HYDROCODONE BITARTRATE AND ACETAMINOPHEN 5; 325 MG/1; MG/1
1 TABLET ORAL EVERY 6 HOURS PRN
Qty: 20 TABLET | Refills: 0 | Status: SHIPPED | OUTPATIENT
Start: 2020-02-05 | End: 2021-02-24

## 2020-02-05 NOTE — DISCHARGE SUMMARY
[facility]  Discharge Summary    Rosana Lowry Patient Status:  Inpatient    1950 MRN [id number]   [facility] 3NW-A Attending Bernardo Banks MD   [facility] Day # 5 PCP Maria Guadalupe Clyaton MD     Date of Admission: 2020    Date of D as free air.     At this time the patient states that she continues to have crampy abdominal pain and has noticed some mucus in her stool.  She denies any nausea or vomiting.  She states she has not had any fevers.  The patient states she has never had any tablet (20 mg total) by mouth 2 (two) times daily. Qty: 60 tablet Refills: 0    HYDROcodone-acetaminophen (NORCO) 5-325 MG Oral Tab  Take 1 tablet by mouth every 6 (six) hours as needed for Pain.   Qty: 20 tablet Refills: 0      CONTINUE these medications

## 2020-02-05 NOTE — PROGRESS NOTES
[facility]    Progress Note    Jacquelyn Lowry Patient Status:  Inpatient    1950 MRN [id number]   [facility] 3NW-A Attending Jorge A Loyd MD   Hosp Day # 5 PCP Tono Hay MD       SUBJECTIVE:  TOLERATING SMALL AMOUNTS O BID  HYDROcodone-acetaminophen (NORCO) 5-325 MG per tab 1 tablet, 1 tablet, Oral, Q4H PRN    Or  HYDROcodone-acetaminophen (NORCO) 5-325 MG per tab 2 tablet, 2 tablet, Oral, Q4H PRN  Labetalol HCl (TRANDATE) injection 10 mg, 10 mg, Intravenous, Q6H PRN  in

## 2020-02-05 NOTE — PLAN OF CARE
Problem: PAIN - ADULT  Goal: Verbalizes/displays adequate comfort level or patient's stated pain goal  Description  INTERVENTIONS:  - Encourage pt to monitor pain and request assistance  - Assess pain using appropriate pain scale  - Administer analgesics RN  Outcome: Adequate for Discharge     Problem: DISCHARGE PLANNING  Goal: Discharge to home or other facility with appropriate resources  Description  INTERVENTIONS:  - Identify barriers to discharge w/pt and caregiver  - Include patient/family/discharge needed  - Establish a toileting routine/schedule  - Consider collaborating with pharmacy to review patient's medication profile  2/5/2020 1331 by Emerson Cannon RN  Outcome: Adequate for Discharge  2/5/2020 1032 by Emerson Cannon RN  Outcome: Adequate for Leopold Martens interventions   2/5/2020 1331 by Alma Shelton RN  Outcome: Adequate for Discharge  2/5/2020 1032 by Alma Shelton RN  Outcome: Adequate for Discharge       NURSING DISCHARGE NOTE    Discharged Home via Wheelchair.   Accompanied by Support staff  Belongings

## 2020-02-05 NOTE — PLAN OF CARE
Pt alert and oriented x 4. Up ad sandoval. Voiding with no difficulties. Abdomen soft and non-distended. Bowel sounds active. Passing flatus. Pt denies nausea. Denies pain at this time.  Midline incision with staples and small transverse to left side, c/d/I. TONNY consult to assist with strengthening/mobility  - Encourage toileting schedule  Outcome: Progressing     Problem: DISCHARGE PLANNING  Goal: Discharge to home or other facility with appropriate resources  Description  INTERVENTIONS:  - Identify barriers to d SKIN/TISSUE INTEGRITY - ADULT  Goal: Skin integrity remains intact  Description  INTERVENTIONS  - Assess and document risk factors for pressure ulcer development  - Assess and document skin integrity  - Monitor for areas of redness and/or skin breakdown  -

## 2020-02-05 NOTE — PLAN OF CARE
Problem: PAIN - ADULT  Goal: Verbalizes/displays adequate comfort level or patient's stated pain goal  Description  INTERVENTIONS:  - Encourage pt to monitor pain and request assistance  - Assess pain using appropriate pain scale  - Administer analgesics resources and transportation as appropriate  - Identify discharge learning needs (meds, wound care, etc)  - Arrange for interpreters to assist at discharge as needed  - Consider post-discharge preferences of patient/family/discharge partner  - Complete JOSEMANUEL for Discharge  Goal: Incision(s), wounds(s) or drain site(s) healing without S/S of infection  Description  INTERVENTIONS:  - Assess and document risk factors for pressure ulcer development  - Assess and document skin integrity  - Assess and document dress

## 2020-02-05 NOTE — PROGRESS NOTES
[facility]  Progress Note    Brandon Lowry Patient Status:  Inpatient    1950 MRN [id number]   [facility] 3NW-A Attending Socrates Smith MD   Hosp Day # 5 PCP Linnette Howard MD     Subjective:  No new complaints, incisional pa driving, may shower  4. Home with drain  5. Regular diet  6. Norco for pain  7. All questions answered    My total face time with this patient was 24 minutes.   Greater than half of our visit was spent in counseling the patient on the above listed diagnoses

## 2020-02-10 ENCOUNTER — MED REC SCAN ONLY (OUTPATIENT)
Dept: SURGERY | Facility: CLINIC | Age: 70
End: 2020-02-10

## 2020-02-13 ENCOUNTER — OFFICE VISIT (OUTPATIENT)
Dept: SURGERY | Facility: CLINIC | Age: 70
End: 2020-02-13

## 2020-02-13 VITALS
WEIGHT: 185 LBS | HEART RATE: 80 BPM | SYSTOLIC BLOOD PRESSURE: 109 MMHG | BODY MASS INDEX: 29.38 KG/M2 | HEIGHT: 66.5 IN | TEMPERATURE: 98 F | DIASTOLIC BLOOD PRESSURE: 74 MMHG

## 2020-02-13 DIAGNOSIS — K57.92 ACUTE DIVERTICULITIS: ICD-10-CM

## 2020-02-13 DIAGNOSIS — Z93.3 STATUS POST HARTMANN'S PROCEDURE (HCC): ICD-10-CM

## 2020-02-13 DIAGNOSIS — K57.40 DIVERTICULITIS OF BOTH SMALL AND LARGE INTESTINE WITH PERFORATION AND ABSCESS: Primary | ICD-10-CM

## 2020-02-13 PROCEDURE — 99024 POSTOP FOLLOW-UP VISIT: CPT | Performed by: PHYSICIAN ASSISTANT

## 2020-02-13 NOTE — PATIENT INSTRUCTIONS
The patient presents today for continued care and evaluation following a takedown colostomy performed on January 31, 2020. The patient states that she is doing well overall since having the procedure. She states she is having very minimal pain.   She ha provided a copy the pathology report as well as the operative report. I discussed with the patient she should avoid any strenuous bending, pushing, pulling, twisting, or lifting force greater than 20 pounds until 8 weeks postoperatively.   The patient ve

## 2020-02-13 NOTE — PROGRESS NOTES
Post Operative Visit Note       Active Problems  1. Diverticulitis of both small and large intestine with perforation and abscess    2. Status post Muna's procedure ([facility] [address]    3.  Acute diverticulitis         Chief Complaint   Patient presents with:  Post Performed by Nicolas Cole MD at [address]   • EXPLORATORY LAPAROTOMY N/A 6/19/2019    Performed by Nicolas Cole MD at [facility] MAIN OR   • HYSTERECTOMY     • [address]     • OTHER  Dec 2013    Lt wrist    • PART REMOVAL COLON W END COLOSTOMY today.  Review of Systems   Constitutional: Negative for chills, diaphoresis, fatigue, fever and unexpected weight change. HENT: Negative for hearing loss, nosebleeds, sore throat and trouble swallowing.     Respiratory: Negative for apnea, cough, shortne patient states that she is doing well overall since having the procedure. She states she is having very minimal pain. She has not required any pain medication. She has been ambulating well and tolerating a regular diet.     The patient denies having any bending, pushing, pulling, twisting, or lifting force greater than 20 pounds until 8 weeks postoperatively. The patient verbalized understanding and agreement with the plan of care. I have no further follow-up scheduled with this patient at this time.

## 2020-05-13 ENCOUNTER — TELEPHONE (OUTPATIENT)
Dept: CARDIOLOGY | Age: 70
End: 2020-05-13

## 2020-08-10 ENCOUNTER — OFFICE VISIT (OUTPATIENT)
Dept: CARDIOLOGY | Age: 70
End: 2020-08-10

## 2020-08-10 VITALS
HEART RATE: 72 BPM | DIASTOLIC BLOOD PRESSURE: 80 MMHG | WEIGHT: 195 LBS | SYSTOLIC BLOOD PRESSURE: 125 MMHG | BODY MASS INDEX: 30.54 KG/M2

## 2020-08-10 DIAGNOSIS — I10 ESSENTIAL HYPERTENSION, BENIGN: Primary | ICD-10-CM

## 2020-08-10 DIAGNOSIS — I65.23 ASYMPTOMATIC CAROTID ARTERY STENOSIS, BILATERAL: ICD-10-CM

## 2020-08-10 DIAGNOSIS — E78.00 PURE HYPERCHOLESTEROLEMIA: ICD-10-CM

## 2020-08-10 PROCEDURE — 99214 OFFICE O/P EST MOD 30 MIN: CPT | Performed by: INTERNAL MEDICINE

## 2020-08-10 RX ORDER — NEBIVOLOL HYDROCHLORIDE 5 MG/1
5 TABLET ORAL 2 TIMES DAILY
Qty: 180 TABLET | Refills: 3 | Status: SHIPPED | OUTPATIENT
Start: 2020-08-10 | End: 2020-12-16 | Stop reason: ALTCHOICE

## 2020-08-10 SDOH — HEALTH STABILITY: PHYSICAL HEALTH: ON AVERAGE, HOW MANY MINUTES DO YOU ENGAGE IN EXERCISE AT THIS LEVEL?: 60 MIN

## 2020-08-10 SDOH — HEALTH STABILITY: PHYSICAL HEALTH: ON AVERAGE, HOW MANY DAYS PER WEEK DO YOU ENGAGE IN MODERATE TO STRENUOUS EXERCISE (LIKE A BRISK WALK)?: 5 DAYS

## 2020-08-10 ASSESSMENT — PATIENT HEALTH QUESTIONNAIRE - PHQ9
2. FEELING DOWN, DEPRESSED OR HOPELESS: NOT AT ALL
CLINICAL INTERPRETATION OF PHQ2 SCORE: NO FURTHER SCREENING NEEDED
SUM OF ALL RESPONSES TO PHQ9 QUESTIONS 1 AND 2: 0
CLINICAL INTERPRETATION OF PHQ9 SCORE: NO FURTHER SCREENING NEEDED
1. LITTLE INTEREST OR PLEASURE IN DOING THINGS: NOT AT ALL
SUM OF ALL RESPONSES TO PHQ9 QUESTIONS 1 AND 2: 0

## 2020-10-29 ENCOUNTER — APPOINTMENT (OUTPATIENT)
Dept: LAB | Age: 70
End: 2020-10-29
Attending: INTERNAL MEDICINE
Payer: COMMERCIAL

## 2020-10-29 DIAGNOSIS — Z20.828 EXPOSURE TO SARS-ASSOCIATED CORONAVIRUS: ICD-10-CM

## 2020-12-08 ENCOUNTER — TELEPHONE (OUTPATIENT)
Dept: CARDIOLOGY | Age: 70
End: 2020-12-08

## 2020-12-16 RX ORDER — METOPROLOL SUCCINATE 25 MG/1
25 TABLET, EXTENDED RELEASE ORAL DAILY
Qty: 30 TABLET | Refills: 11 | Status: SHIPPED | OUTPATIENT
Start: 2020-12-16

## 2021-02-25 ENCOUNTER — HOSPITAL ENCOUNTER (OUTPATIENT)
Dept: BONE DENSITY | Age: 71
Discharge: HOME | End: 2021-02-25
Attending: INTERNAL MEDICINE
Payer: COMMERCIAL

## 2021-02-25 DIAGNOSIS — Z78.0 POST-MENOPAUSAL: ICD-10-CM

## 2021-02-25 PROCEDURE — 77080 DXA BONE DENSITY AXIAL: CPT | Performed by: INTERNAL MEDICINE

## 2021-05-19 ENCOUNTER — TELEPHONE (OUTPATIENT)
Dept: CARDIOLOGY | Age: 71
End: 2021-05-19

## 2021-07-12 ENCOUNTER — TELEPHONE (OUTPATIENT)
Dept: CARDIOLOGY | Age: 71
End: 2021-07-12

## 2022-06-22 ENCOUNTER — ORDER TRANSCRIPTION (OUTPATIENT)
Dept: ADMINISTRATIVE | Facility: HOSPITAL | Age: 72
End: 2022-06-22

## 2022-06-22 DIAGNOSIS — Z11.59 ENCOUNTER FOR SCREENING FOR OTHER VIRAL DISEASES: ICD-10-CM

## 2022-06-22 DIAGNOSIS — Z01.818 PREOP EXAMINATION: Primary | ICD-10-CM

## 2023-01-11 ENCOUNTER — TELEPHONE (OUTPATIENT)
Dept: SURGERY | Facility: CLINIC | Age: 73
End: 2023-01-11

## 2023-01-11 NOTE — TELEPHONE ENCOUNTER
Spoke with patient and informed her nurse will give Dr Patsy Ganser a copy of Dr Damian Vincent' notes form today's OV to review and she will be called as soon as an appointment is available.

## 2023-01-11 NOTE — TELEPHONE ENCOUNTER
Pt calling to schedule NP appt. Referred by Dr. Meet Yan. Pt states she has been having episodes of what she thought was occular migraines but she has had difficulty speaking after the episode and was told by Dr. Meet Yan she needs to schedule to see a neurologist due to the possibility of mini strokes. Pt is scheduled for first available and on a waitlist. Please advise if this pt can be seen sooner than first available.

## 2023-01-18 ENCOUNTER — HOSPITAL ENCOUNTER (OUTPATIENT)
Dept: ULTRASOUND IMAGING | Facility: HOSPITAL | Age: 73
Discharge: HOME | End: 2023-01-18
Attending: INTERNAL MEDICINE
Payer: COMMERCIAL

## 2023-01-18 DIAGNOSIS — E04.1 THYROID NODULE: ICD-10-CM

## 2023-01-18 PROCEDURE — 76536 US EXAM OF HEAD AND NECK: CPT | Performed by: INTERNAL MEDICINE

## 2023-01-20 ENCOUNTER — ORDER TRANSCRIPTION (OUTPATIENT)
Dept: ADMINISTRATIVE | Facility: HOSPITAL | Age: 73
End: 2023-01-20

## 2023-01-20 DIAGNOSIS — Z11.59 ENCOUNTER FOR SCREENING FOR OTHER VIRAL DISEASES: ICD-10-CM

## 2023-01-20 DIAGNOSIS — E04.1 THYROID NODULE: Primary | ICD-10-CM

## 2023-01-20 DIAGNOSIS — Z01.818 PREPROCEDURAL EXAMINATION: ICD-10-CM

## 2023-02-03 ENCOUNTER — LAB ENCOUNTER (OUTPATIENT)
Dept: LAB | Age: 73
End: 2023-02-03
Attending: INTERNAL MEDICINE
Payer: COMMERCIAL

## 2023-02-03 DIAGNOSIS — Z01.818 PREPROCEDURAL EXAMINATION: ICD-10-CM

## 2023-02-03 DIAGNOSIS — Z11.59 ENCOUNTER FOR SCREENING FOR OTHER VIRAL DISEASES: ICD-10-CM

## 2023-02-03 DIAGNOSIS — E04.1 THYROID NODULE: ICD-10-CM

## 2023-02-04 LAB — SARS-COV-2 RNA RESP QL NAA+PROBE: NOT DETECTED

## 2023-02-06 ENCOUNTER — HOSPITAL ENCOUNTER (OUTPATIENT)
Dept: ULTRASOUND IMAGING | Facility: HOSPITAL | Age: 73
Discharge: HOME | End: 2023-02-06
Attending: INTERNAL MEDICINE
Payer: COMMERCIAL

## 2023-02-06 DIAGNOSIS — E04.1 THYROID NODULE: ICD-10-CM

## 2023-02-06 PROCEDURE — 10005 FNA BX W/US GDN 1ST LES: CPT | Performed by: INTERNAL MEDICINE

## 2023-02-06 PROCEDURE — 88173 CYTOPATH EVAL FNA REPORT: CPT | Performed by: INTERNAL MEDICINE

## 2023-03-10 ENCOUNTER — HOSPITAL ENCOUNTER (OUTPATIENT)
Dept: CV DIAGNOSTICS | Facility: HOSPITAL | Age: 73
Discharge: HOME | End: 2023-03-10
Attending: INTERNAL MEDICINE
Payer: COMMERCIAL

## 2023-03-10 DIAGNOSIS — I27.0 PRIMARY PULMONARY HTN (HCC): ICD-10-CM

## 2023-03-10 DIAGNOSIS — R53.83 FATIGUE: ICD-10-CM

## 2023-03-10 DIAGNOSIS — R00.0 TACHYCARDIA: ICD-10-CM

## 2023-03-10 PROCEDURE — 93018 CV STRESS TEST I&R ONLY: CPT | Performed by: INTERNAL MEDICINE

## 2023-03-10 PROCEDURE — 93350 STRESS TTE ONLY: CPT | Performed by: INTERNAL MEDICINE

## 2023-03-10 PROCEDURE — 93017 CV STRESS TEST TRACING ONLY: CPT | Performed by: INTERNAL MEDICINE

## 2023-03-16 PROBLEM — E78.5 HYPERLIPIDEMIA: Status: ACTIVE | Noted: 2021-07-19

## 2023-05-17 ENCOUNTER — OFFICE VISIT (OUTPATIENT)
Dept: NEUROLOGY | Facility: CLINIC | Age: 73
End: 2023-05-17
Payer: COMMERCIAL

## 2023-05-17 VITALS — HEART RATE: 81 BPM | DIASTOLIC BLOOD PRESSURE: 72 MMHG | SYSTOLIC BLOOD PRESSURE: 120 MMHG | RESPIRATION RATE: 16 BRPM

## 2023-05-17 DIAGNOSIS — R42 DIZZINESS: ICD-10-CM

## 2023-05-17 DIAGNOSIS — G43.109 OCULAR MIGRAINE: Primary | ICD-10-CM

## 2023-05-17 PROCEDURE — 3078F DIAST BP <80 MM HG: CPT | Performed by: OTHER

## 2023-05-17 PROCEDURE — 99204 OFFICE O/P NEW MOD 45 MIN: CPT | Performed by: OTHER

## 2023-05-17 PROCEDURE — 3074F SYST BP LT 130 MM HG: CPT | Performed by: OTHER
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
## 2023-06-05 ENCOUNTER — HOSPITAL ENCOUNTER (OUTPATIENT)
Dept: MRI IMAGING | Facility: HOSPITAL | Age: 73
Discharge: HOME | End: 2023-06-05
Attending: Other
Payer: COMMERCIAL

## 2023-06-05 DIAGNOSIS — R42 DIZZINESS: ICD-10-CM

## 2023-06-05 DIAGNOSIS — G43.109 OCULAR MIGRAINE: ICD-10-CM

## 2023-06-05 PROCEDURE — 70551 MRI BRAIN STEM W/O DYE: CPT | Performed by: OTHER

## 2023-09-21 NOTE — CONSULTS
[facility]  Report of Consultation    Na Vaca Lowry Patient Status:  Emergency    1950 MRN [id number]   Location [address] [facility] Attending Jovany Gibbs MD   Hosp Day # 0 PCP Freeman Richardson MD     Reason for Consultat states she does not drink alcohol. I acted as a scribe in this encounter. The physician obtained a history, independently performed a physical exam, and developed an assessment and plan. The physician performed all medical decision making.     Lydia 66\", weight 180 lb, SpO2 96 %. General: Alert, orientated x3. Cooperative. No apparent distress. HEENT: Without scleral icterus. Mucous membranes are moist. EOM are intact. Neck: No tenderness to palpitation.   Full range of motion to flexion a Radiology Data Registry) which includes the Dose Index Registry. PATIENT STATED HISTORY:(As transcribed by Technologist)  The patient has bilateral lower abdominal cramping with abnormal bowel movements.       CONTRAST USED:  100cc of Omnipaque 350 cysts, lymphoceles or seromas with or without super infection. Mild to moderate left hydroureter is present. This may be due to mass effect on the distal left ureter due to the inflammatory changes present within the pelvis.      Critical results were Medical Necessity Clause: This procedure was medically necessary because the lesions that were treated were: Elias patient seemed to understand the conversation and its details. Consent for surgery was confirmed with the patient.   Time spent on counseling/coordination of care:  45 Minutes    Total time spent with patient:  1 Hour 15 Minutes    Al Mention  6/19/ Render Post-Care Instructions In Note?: no Medical Necessity Information: It is in your best interest to select a reason for this procedure from the list below. All of these items fulfill various CMS LCD requirements except the new and changing color options. Duration Of Freeze Thaw-Cycle (Seconds): 0 Detail Level: Detailed Consent: The patient's consent was obtained including but not limited to risks of crusting, scabbing, blistering, scarring, darker or lighter pigmentary change, recurrence, incomplete removal and infection. Spray Paint Text: The liquid nitrogen was applied to the skin utilizing a spray paint frosting technique. Post-Care Instructions: I reviewed with the patient in detail post-care instructions. Patient is to wear sunprotection, and avoid picking at any of the treated lesions. Pt may apply Vaseline to crusted or scabbing areas. Show Spray Paint Technique Variable?: Yes

## 2023-11-17 ENCOUNTER — OFFICE VISIT (OUTPATIENT)
Dept: NEUROLOGY | Facility: CLINIC | Age: 73
End: 2023-11-17
Payer: COMMERCIAL

## 2023-11-17 VITALS
RESPIRATION RATE: 16 BRPM | SYSTOLIC BLOOD PRESSURE: 124 MMHG | DIASTOLIC BLOOD PRESSURE: 66 MMHG | BODY MASS INDEX: 34 KG/M2 | HEART RATE: 100 BPM | WEIGHT: 210 LBS

## 2023-11-17 DIAGNOSIS — R42 DIZZINESS: ICD-10-CM

## 2023-11-17 DIAGNOSIS — G43.109 OCULAR MIGRAINE: Primary | ICD-10-CM

## 2023-11-17 PROCEDURE — 99214 OFFICE O/P EST MOD 30 MIN: CPT | Performed by: OTHER

## 2023-11-17 PROCEDURE — 3074F SYST BP LT 130 MM HG: CPT | Performed by: OTHER

## 2023-11-17 PROCEDURE — 3078F DIAST BP <80 MM HG: CPT | Performed by: OTHER

## 2023-11-17 NOTE — PROGRESS NOTES
Pt states here for follow up on migraines. Pt states ocular migraines have been better.  Pt had MRI done

## 2024-05-10 ENCOUNTER — OFFICE VISIT (OUTPATIENT)
Dept: NEUROLOGY | Facility: CLINIC | Age: 74
End: 2024-05-10
Payer: COMMERCIAL

## 2024-05-10 VITALS
DIASTOLIC BLOOD PRESSURE: 86 MMHG | RESPIRATION RATE: 16 BRPM | HEART RATE: 71 BPM | SYSTOLIC BLOOD PRESSURE: 128 MMHG | WEIGHT: 200 LBS | BODY MASS INDEX: 32 KG/M2

## 2024-05-10 DIAGNOSIS — G43.109 OCULAR MIGRAINE: Primary | ICD-10-CM

## 2024-05-10 PROCEDURE — 99214 OFFICE O/P EST MOD 30 MIN: CPT | Performed by: OTHER

## 2024-05-10 RX ORDER — SUMATRIPTAN AND NAPROXEN SODIUM 85; 500 MG/1; MG/1
TABLET, FILM COATED ORAL
Qty: 9 TABLET | Refills: 0 | Status: SHIPPED | OUTPATIENT
Start: 2024-05-10

## 2024-05-10 NOTE — PROGRESS NOTES
Pt states she has had 3-4 ocula migraines in the last 6 months. Things are about the same for her no questions or concerns    Never
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
## 2024-05-10 NOTE — PATIENT INSTRUCTIONS
Refill policies:    Allow 2-3 business days for refills; controlled substances may take longer.  Contact your pharmacy at least 5 days prior to running out of medication and have them send an electronic request or submit request through the “request refill” option in your [employer] account.  Refills are not addressed on weekends; covering physicians do not authorize routine medications on weekends.  No narcotics or controlled substances are refilled after noon on Fridays or by on call physicians.  By law, narcotics must be electronically prescribed.  A 30 day supply with no refills is the maximum allowed.  If your prescription is due for a refill, you may be due for a follow up appointment.  To best provide you care, patients receiving routine medications need to be seen at least once a year.  Patients receiving narcotic/controlled substance medications need to be seen at least once every 3 months.  In the event that your preferred pharmacy does not have the requested medication in stock (e.g. Backordered), it is your responsibility to find another pharmacy that has the requested medication available.  We will gladly send a new prescription to that pharmacy at your request.    Scheduling Tests:    If your physician has ordered radiology tests such as MRI or CT scans, please contact Central Scheduling at [phone] right away to schedule the test.  Once scheduled, the [facility] Centralized Referral Team will work with your insurance carrier to obtain pre-certification or prior authorization.  Depending on your insurance carrier, approval may take 3-10 days.  It is highly recommended patients assure they have received an authorization before having a test performed.  If test is done without insurance authorization, patient may be responsible for the entire amount billed.      Precertification and Prior Authorizations:  If your physician has recommended that you have a procedure or additional testing performed the [facility]  Centralized Referral Team will contact your insurance carrier to obtain pre-certification or prior authorization.    You are strongly encouraged to contact your insurance carrier to verify that your procedure/test has been approved and is a COVERED benefit.  Although the [facility] Centralized Referral Team does its due diligence, the insurance carrier gives the disclaimer that \"Although the procedure is authorized, this does not guarantee payment.\"    Ultimately the patient is responsible for payment.   Thank you for your understanding in this matter.  Paperwork Completion:  If you require FMLA or disability paperwork for your recovery, please make sure to either drop it off or have it faxed to our office at [phone]. Be sure the form has your name and date of birth on it.  The form will be faxed to our Forms Department and they will complete it for you.  There is a 25$ fee for all forms that need to be filled out.  Please be aware there is a 10-14 day turnaround time.  You will need to sign a release of information (SYLVIA) form if your paperwork does not come with one.  You may call the Forms Department with any questions at [phone].  Their fax number is [phone].

## 2024-05-13 ENCOUNTER — TELEPHONE (OUTPATIENT)
Dept: NEUROLOGY | Facility: CLINIC | Age: 74
End: 2024-05-13

## 2024-05-13 NOTE — TELEPHONE ENCOUNTER
Leigh pharmacist at [facility] is requesting to speak with clinical staff in regards to prescription for SUMAtriptan-Naproxen Sodium  MG.     Pharmacist states that medication is not commonly used. Medication is on back order until 06/07/2024. Pharmacist also wanted to give the office information that co-pay for medication in $259.

## 2024-05-13 NOTE — TELEPHONE ENCOUNTER
Per notes below, (treximet) Sumatriptan-Naproxen  is on back order and copay would be $259.    Routed to covering provider to advise on changing to two separate RX.

## 2024-05-14 RX ORDER — NAPROXEN 500 MG/1
TABLET ORAL
Qty: 15 TABLET | Refills: 2 | Status: SHIPPED | OUTPATIENT
Start: 2024-05-14

## 2024-05-14 NOTE — TELEPHONE ENCOUNTER
Continue her sumatriptan 50 mg for the time being and add the naproxen on when she takes sumatriptan as well.  If sumatriptan at 50 mg with the addition of naproxen is not helping we will consider increasing to 100 mg

## 2024-05-15 RX ORDER — SUMATRIPTAN 50 MG/1
TABLET, FILM COATED ORAL
Qty: 9 TABLET | Refills: 0 | Status: SHIPPED | OUTPATIENT
Start: 2024-05-15

## 2024-06-05 DIAGNOSIS — K57.32 DIVERTICULITIS OF LARGE INTESTINE WITHOUT PERFORATION OR ABSCESS WITHOUT BLEEDING: Primary | ICD-10-CM

## 2024-06-05 DIAGNOSIS — N39.0 URINARY TRACT INFECTION WITHOUT HEMATURIA, SITE UNSPECIFIED: Primary | ICD-10-CM

## 2024-06-05 DIAGNOSIS — K57.92 ACUTE DIVERTICULITIS: ICD-10-CM

## 2024-06-06 ENCOUNTER — HOSPITAL ENCOUNTER (OUTPATIENT)
Dept: CT IMAGING | Facility: HOSPITAL | Age: 74
Discharge: HOME | End: 2024-06-06
Attending: INTERNAL MEDICINE
Payer: COMMERCIAL

## 2024-06-06 ENCOUNTER — LAB ENCOUNTER (OUTPATIENT)
Dept: LAB | Facility: HOSPITAL | Age: 74
End: 2024-06-06
Attending: INTERNAL MEDICINE
Payer: COMMERCIAL

## 2024-06-06 DIAGNOSIS — N39.0 URINARY TRACT INFECTION WITHOUT HEMATURIA, SITE UNSPECIFIED: ICD-10-CM

## 2024-06-06 DIAGNOSIS — K57.32 DIVERTICULITIS OF LARGE INTESTINE WITHOUT PERFORATION OR ABSCESS WITHOUT BLEEDING: ICD-10-CM

## 2024-06-06 LAB
ALBUMIN SERPL-MCNC: 3.9 G/DL (ref 3.4–5)
ALBUMIN/GLOB SERPL: 1.1 {RATIO} (ref 1–2)
ALP LIVER SERPL-CCNC: 74 U/L
ALT SERPL-CCNC: 45 U/L
ANION GAP SERPL CALC-SCNC: 7 MMOL/L (ref 0–18)
AST SERPL-CCNC: 26 U/L (ref 15–37)
BILIRUB SERPL-MCNC: 1 MG/DL (ref 0.1–2)
BILIRUB UR QL STRIP.AUTO: NEGATIVE
BUN BLD-MCNC: 8 MG/DL (ref 9–23)
CALCIUM BLD-MCNC: 9.1 MG/DL (ref 8.5–10.1)
CHLORIDE SERPL-SCNC: 107 MMOL/L (ref 98–112)
CLARITY UR REFRACT.AUTO: CLEAR
CO2 SERPL-SCNC: 25 MMOL/L (ref 21–32)
COLOR UR AUTO: YELLOW
CREAT BLD-MCNC: 0.78 MG/DL
EGFRCR SERPLBLD CKD-EPI 2021: 80 ML/MIN/1.73M2 (ref 60–?)
FASTING STATUS PATIENT QL REPORTED: YES
GLOBULIN PLAS-MCNC: 3.5 G/DL (ref 2.8–4.4)
GLUCOSE BLD-MCNC: 118 MG/DL (ref 70–99)
GLUCOSE UR STRIP.AUTO-MCNC: NORMAL MG/DL
KETONES UR STRIP.AUTO-MCNC: NEGATIVE MG/DL
LEUKOCYTE ESTERASE UR QL STRIP.AUTO: NEGATIVE
NITRITE UR QL STRIP.AUTO: NEGATIVE
OSMOLALITY SERPL CALC.SUM OF ELEC: 287 MOSM/KG (ref 275–295)
PH UR STRIP.AUTO: 6.5 [PH] (ref 5–8)
POTASSIUM SERPL-SCNC: 4.1 MMOL/L (ref 3.5–5.1)
PROT SERPL-MCNC: 7.4 G/DL (ref 6.4–8.2)
PROT UR STRIP.AUTO-MCNC: NEGATIVE MG/DL
RBC UR QL AUTO: NEGATIVE
SODIUM SERPL-SCNC: 139 MMOL/L (ref 136–145)
SP GR UR STRIP.AUTO: 1.01 (ref 1–1.03)
UROBILINOGEN UR STRIP.AUTO-MCNC: NORMAL MG/DL

## 2024-06-06 PROCEDURE — 36415 COLL VENOUS BLD VENIPUNCTURE: CPT

## 2024-06-06 PROCEDURE — 80053 COMPREHEN METABOLIC PANEL: CPT

## 2024-06-06 PROCEDURE — 81003 URINALYSIS AUTO W/O SCOPE: CPT

## 2024-06-06 PROCEDURE — 74177 CT ABD & PELVIS W/CONTRAST: CPT | Performed by: INTERNAL MEDICINE

## 2024-09-01 NOTE — PLAN OF CARE
Patient alert and oriented  Blood pressure controlled with PRN and scheduled meds  Ambulating with stand by assist  Lungs clear, no cough noted  Passing gas at this time, remains on clear liquid diet.     Problem: PAIN - ADULT  Goal: Verbalizes/displays faye with strengthening/mobility  - Encourage toileting schedule  2/4/2020 0538 by Sharal Navarro RN  Outcome: Progressing  2/4/2020 0538 by Sharla Navarro RN  Outcome: Progressing     Problem: DISCHARGE PLANNING  Goal: Discharge to home or other facility w Evaluate effectiveness of GI medications  - Encourage mobilization and activity  - Obtain nutritional consult as needed  - Establish a toileting routine/schedule  - Consider collaborating with pharmacy to review patient's medication profile  2/4/2020 [phone] [id number] Xray Elbow AP + Lateral + Oblique, Bilat

## (undated) DEVICE — SUTURE PROLENE 2-0 SH

## (undated) DEVICE — LAPAROTOMY CDS: Brand: MEDLINE INDUSTRIES, INC.

## (undated) DEVICE — CAUTERY PENCIL

## (undated) DEVICE — TOWEL OR BLU 16X26 STRL

## (undated) DEVICE — LIGASURE IMPACT OPEN DEVICE

## (undated) DEVICE — VIOLET BRAIDED (POLYGLACTIN 910), SYNTHETIC ABSORBABLE SUTURE: Brand: COATED VICRYL

## (undated) DEVICE — 3M(TM) MICROPORE TAPE DISPENSER 1535-2: Brand: 3M™ MICROPORE™

## (undated) DEVICE — SPONGE: SPECIALTY PEANUT XR 100/CS: Brand: MEDICAL ACTION INDUSTRIES

## (undated) DEVICE — SUTURE SILK 3-0 SH

## (undated) DEVICE — SUTURE CHROMIC GUT 3-0 CP-2

## (undated) DEVICE — PROXIMATE RELOADABLE LINEAR CUTTER WITH SAFETY LOCK-OUT.  55MM LINEAR CUTTER.: Brand: PROXIMATE

## (undated) DEVICE — GOWN,SIRUS,FABRIC-REINFORCED,X-LARGE: Brand: MEDLINE

## (undated) DEVICE — CLOSING BUNDLE: Brand: MEDLINE INDUSTRIES, INC.

## (undated) DEVICE — KENDALL SCD EXPRESS SLEEVES, KNEE LENGTH, MEDIUM: Brand: KENDALL SCD

## (undated) DEVICE — POOLE SUCTION HANDLE: Brand: CARDINAL HEALTH

## (undated) DEVICE — TUBING CYSTO

## (undated) DEVICE — GAMMEX® NON-LATEX PI TEXTURED SIZE 6.5, STERILE POLYISOPRENE POWDER-FREE SURGICAL GLOVE: Brand: GAMMEX

## (undated) DEVICE — STERILE POLYISOPRENE POWDER-FREE SURGICAL GLOVES: Brand: PROTEXIS

## (undated) DEVICE — DRAPE EQUIPMENT INTRATEMP THER

## (undated) DEVICE — CHLORAPREP 26ML APPLICATOR

## (undated) DEVICE — SUTURE VICRYL 3-0 SH

## (undated) DEVICE — DRAIN CHANNEL 19FR BLAKE

## (undated) DEVICE — PROXIMATE RH ROTATING HEAD SKIN STAPLERS (35 WIDE) CONTAINS 35 STAINLESS STEEL STAPLES: Brand: PROXIMATE

## (undated) DEVICE — SIGMOIDOSCOPE LIGHTED BIOSEAL

## (undated) DEVICE — PROXIMATE RELOADABLE LINEAR CUTTER WITH SAFETY LOCK-OUT, 75MM: Brand: PROXIMATE

## (undated) DEVICE — ABDOMINAL PAD: Brand: DERMACEA

## (undated) DEVICE — SUTURE VICRYL 2-0

## (undated) DEVICE — DRAPE LEGGING STERILE

## (undated) DEVICE — SOL  .9 1000ML BTL

## (undated) DEVICE — SUTURE ETHILON 2-0 FS

## (undated) DEVICE — DRAIN RELIAVAC 100CC

## (undated) DEVICE — Device

## (undated) DEVICE — SUTURE VICRYL 4-0 SH-1

## (undated) DEVICE — SPECIMEN CONTAINER,POSITIVE SEAL INDICATOR, OR PACKAGED: Brand: PRECISION

## (undated) DEVICE — SUTURE PDS II 1 TP-1

## (undated) DEVICE — BLADE ELECTRODE: Brand: EDGE

## (undated) DEVICE — SOL  .9 3000ML

## (undated) DEVICE — SYRINGE 30ML LL TIP

## (undated) DEVICE — SUTURE SILK 2-0

## (undated) DEVICE — PROXIMATE SKIN STAPLERS (35 WIDE) CONTAINS 35 STAINLESS STEEL STAPLES (FIXED HEAD): Brand: PROXIMATE

## (undated) DEVICE — SUTURE VICRYL 0

## (undated) DEVICE — 1-PIECE DRAINABLE OSTOMY POUCH, FLEXWEAR: Brand: PREMIER

## (undated) DEVICE — REM POLYHESIVE ADULT PATIENT RETURN ELECTRODE: Brand: VALLEYLAB

## (undated) DEVICE — ECHELON FLEX POWERED PLUS COMPACT ARTICULATING ENDOSCOPIC LINEAR CUTTER, 60MM: Brand: ECHELON FLEX

## (undated) DEVICE — HEX-LOCKING BLADE ELECTRODE: Brand: EDGE

## (undated) DEVICE — COVER,MAYO STAND,STERILE: Brand: MEDLINE

## (undated) DEVICE — CIRCULAR MECH XL SEAL 25MM

## (undated) DEVICE — LAPAROTOMY SPONGE - RF AND X-RAY DETECTABLE PRE-WASHED: Brand: SITUATE

## (undated) DEVICE — SUTURE SILK 0

## (undated) DEVICE — ENDOPATH ECHELON ENDOSCOPIC LINEAR CUTTER RELOADS, BLUE, 60MM: Brand: ECHELON ENDOPATH

## (undated) DEVICE — 3M™ TEGADERM™ TRANSPARENT FILM DRESSING, 1626W, 4 IN X 4-3/4 IN (10 CM X 12 CM), 50 EACH/CARTON, 4 CARTON/CASE: Brand: 3M™ TEGADERM™

## (undated) DEVICE — BAG DRAIN INFECTION CNTRL 2000

## (undated) DEVICE — MEDI-VAC TUBING CONNECTOR 6-IN-1 "Y" POLYPROPYLENE: Brand: CARDINAL HEALTH

## (undated) NOTE — LETTER
[address] [address]  [place], [address] [facility]    Consent for Operation  Date: __________________                                Time: _______________    1.  I authorize the performance upon Betsy Lowry the following operation:  Procedure(s procedure has been videotaped, the surgeon will obtain the original videotape. The hospital will not be responsible for storage or maintenance of this tape.   7. For the purpose of advancing medical education, I consent to the admittance of observers to the STATEMENTS REQUIRING INSERTION OR COMPLETION WERE FILLED IN.     Signature of Patient:   ___________________________    When the patient is a minor or mentally incompetent to give consent:  Signature of person authorized to consent for patient: ____________ drugs/illegal medications). Failure to inform my anesthesiologist about these medicines may increase my risk of anesthetic complications. iv. If I am allergic to anything or have had a reaction to anesthesia before.   3. I understand how the anesthesia med I have discussed the procedure and information above with the patient (or patient’s representative) and answered their questions. The patient or their representative has agreed to have anesthesia services.     _______________________________________________

## (undated) NOTE — IP AVS SNAPSHOT
Patient Demographics     Address  [address] [place] [address] [place] [address] Phone  [phone] (Home)  [phone] (Mobile) *Preferred* E-mail Address  [email or web address]      Emergency Contact(s)     Name Relation Home Work Mobile    dinah shepherd Son   6 HYDROcodone-acetaminophen 5-325 MG Tabs  Commonly known as:  Norco  Notes to patient:  *DON'T TAKE WITH TYLENOL OR TYLENOL CONTAINING PRODUCTS  *DON'T EXCEED 4000 MG OF ACETAMINOPHEN IN 24 HRS  *DON'T DRIVE OR DRINK ALCOHOL IF TAKING NORCO  *TAKE WITH FOOD Order ID Medication Name Action Time Action Reason Comments    [id number] Heparin Sodium (Porcine) 5000 UNIT/ML injection 5,000 Units 02/04/20 1442 Given      [id number] Heparin Sodium (Porcine) 5000 UNIT/ML injection 5,000 Units 02/05/20 6814 Given The patient has a past surgical history significant for an exploratory laparotomy, low anterior resection with creation of colostomy and right oophorectomy, as well as a vaginal hysterectomy and a D&C     The patient has a past medical history significant •  Nebivolol HCl (BYSTOLIC) 10 MG Oral Tab, Take 5 mg by mouth 2 (two) times daily.   , Disp: , Rfl:            Physical Findings      Ht 66.5\"   Wt 185 lb (83.9 kg)   BMI 29.41 kg/m²   Physical Exam   Constitutional: She is oriented to person, place, and Skin: Skin is warm and dry. No rash noted. She is not diaphoretic. Psychiatric: She has a normal mood and affect. Her behavior is normal. Judgment and thought content normal.   Nursing note and vitals reviewed.          Assessment   Diverticulitis  (prima TAKEDOWN. SHE  HAD UNDERGONE COLECTOMY WITH PRITI POUCH PROCEDURE, AFTER DIVERTICULITIS WITH PERFORATION AND ABSCESS IN [place], 2019.    Feels nauseas   History:  Past Medical History:   Diagnosis Date   • Frequent UTI    • HTN (hypertension)    • Unspecif •  famoTIDine (PEPCID) tab 20 mg, 20 mg, Oral, BID **OR** famoTIDine (PEPCID) injection 20 mg, 20 mg, Intravenous, BID  •  HYDROcodone-acetaminophen (NORCO) 5-325 MG per tab 1 tablet, 1 tablet, Oral, Q4H PRN **OR** HYDROcodone-acetaminophen ([facility]) 5-325 M Personal history of colonic polyps     Benign neoplasm of cecum     Benign neoplasm of transverse colon          Recommendations:  HTN-CURRENTLY BP IS LOW. MONITOR  PAIN CONTROL -WESLY, PER PT  NAUSEA-NONE[RM.1] add reglan[RM. 2]   FREQUENT UTIS-NO AGUS Description:  Patient's Short Term Goal: HAVE TOLERABLE PAIN  Interventions:   - GIVE PAIN MEDS AS NEEDED  - See additional Care Plan goals for specific interventions

## (undated) NOTE — LETTER
19    Patient: Shelley Robertson  : 1950 Visit date: 2019    Dear  Dr. Aruna Diana MD,    Thank you for referring Shelley Robertson to my practice. Please find my assessment and plan below.              Assessment   Acute diverticulitis  (primary the patient to let us know if she is to receive a colonoscopy or not. She will see me the first week of November 2019, for surgical decision making for her colostomy take down in January 2020.  She can resume full activity 10 weeks following the procedure w

## (undated) NOTE — LETTER
[address] [address]  [place], [address] [facility]    Consent for Operation  Date: __________________                                Time: _______________    1.  I authorize the performance upon Betsy Lowry the following operation:  Procedure(s procedure has been videotaped, the surgeon will obtain the original videotape. The hospital will not be responsible for storage or maintenance of this tape.   7. For the purpose of advancing medical education, I consent to the admittance of observers to the STATEMENTS REQUIRING INSERTION OR COMPLETION WERE FILLED IN.     Signature of Patient:   ___________________________    When the patient is a minor or mentally incompetent to give consent:  Signature of person authorized to consent for patient: ____________ drugs/illegal medications). Failure to inform my anesthesiologist about these medicines may increase my risk of anesthetic complications. iv. If I am allergic to anything or have had a reaction to anesthesia before.   3. I understand how the anesthesia med I have discussed the procedure and information above with the patient (or patient’s representative) and answered their questions. The patient or their representative has agreed to have anesthesia services.     _______________________________________________

## (undated) NOTE — LETTER
[address] [address]  [place], [address] [facility]    Consent for Operation  Date: __________________                                Time: _______________    1.  I authorize the performance upon Betsy Lowry the following operation:  Procedure(s procedure has been videotaped, the surgeon will obtain the original videotape. The hospital will not be responsible for storage or maintenance of this tape.   7. For the purpose of advancing medical education, I consent to the admittance of observers to the STATEMENTS REQUIRING INSERTION OR COMPLETION WERE FILLED IN.     Signature of Patient:   ___________________________    When the patient is a minor or mentally incompetent to give consent:  Signature of person authorized to consent for patient: ____________ drugs/illegal medications). Failure to inform my anesthesiologist about these medicines may increase my risk of anesthetic complications. iv. If I am allergic to anything or have had a reaction to anesthesia before.   3. I understand how the anesthesia med I have discussed the procedure and information above with the patient (or patient’s representative) and answered their questions. The patient or their representative has agreed to have anesthesia services.     _______________________________________________

## (undated) NOTE — LETTER
19    Patient: Talia Orozco  : 1950 Visit date: 2019    Dear  Dr. Mathieu Palma MD,    Thank you for referring Talia Orozco to my practice. Please find my assessment and plan below.         Assessment   Diverticulitis  (primary encounter The patient states that her ostomy is functioning well. She denies any constipation or diarrhea. She denies any blood, mucus, or dark tarry stools.     The patient had a visit with her gastroenterologist Dr. Nabeel Ye and is scheduled to undergo a colon